# Patient Record
Sex: FEMALE | Race: BLACK OR AFRICAN AMERICAN | NOT HISPANIC OR LATINO | Employment: OTHER | URBAN - METROPOLITAN AREA
[De-identification: names, ages, dates, MRNs, and addresses within clinical notes are randomized per-mention and may not be internally consistent; named-entity substitution may affect disease eponyms.]

---

## 2017-05-08 ENCOUNTER — HOSPITAL ENCOUNTER (EMERGENCY)
Facility: HOSPITAL | Age: 52
Discharge: HOME | End: 2017-05-08
Attending: SPECIALIST
Payer: MEDICAID

## 2017-05-08 VITALS
SYSTOLIC BLOOD PRESSURE: 137 MMHG | TEMPERATURE: 98 F | OXYGEN SATURATION: 100 % | HEART RATE: 79 BPM | RESPIRATION RATE: 20 BRPM | DIASTOLIC BLOOD PRESSURE: 89 MMHG

## 2017-05-08 DIAGNOSIS — R56.9 SEIZURE: Primary | ICD-10-CM

## 2017-05-08 DIAGNOSIS — E87.6 HYPOKALEMIA: ICD-10-CM

## 2017-05-08 LAB
ALBUMIN SERPL BCP-MCNC: 3.7 G/DL
ALP SERPL-CCNC: 105 U/L
ALT SERPL W/O P-5'-P-CCNC: 9 U/L
AMPHET+METHAMPHET UR QL: NEGATIVE
ANION GAP SERPL CALC-SCNC: 14 MMOL/L
ANISOCYTOSIS BLD QL SMEAR: SLIGHT
AST SERPL-CCNC: 18 U/L
BACTERIA #/AREA URNS HPF: ABNORMAL /HPF
BARBITURATES UR QL SCN>200 NG/ML: NEGATIVE
BASOPHILS # BLD AUTO: 0.03 K/UL
BASOPHILS NFR BLD: 0.6 %
BENZODIAZ UR QL SCN>200 NG/ML: NEGATIVE
BILIRUB SERPL-MCNC: 0.6 MG/DL
BILIRUB UR QL STRIP: NEGATIVE
BUN SERPL-MCNC: 14 MG/DL
BZE UR QL SCN: NEGATIVE
CALCIUM SERPL-MCNC: 9.5 MG/DL
CANNABINOIDS UR QL SCN: NEGATIVE
CHLORIDE SERPL-SCNC: 105 MMOL/L
CLARITY UR: CLEAR
CO2 SERPL-SCNC: 23 MMOL/L
COLOR UR: YELLOW
CREAT SERPL-MCNC: 0.8 MG/DL
CREAT UR-MCNC: 62.5 MG/DL
DACRYOCYTES BLD QL SMEAR: ABNORMAL
DIFFERENTIAL METHOD: ABNORMAL
EOSINOPHIL # BLD AUTO: 0.1 K/UL
EOSINOPHIL NFR BLD: 1.9 %
ERYTHROCYTE [DISTWIDTH] IN BLOOD BY AUTOMATED COUNT: 13.3 %
EST. GFR  (AFRICAN AMERICAN): >60 ML/MIN/1.73 M^2
EST. GFR  (NON AFRICAN AMERICAN): >60 ML/MIN/1.73 M^2
GLUCOSE SERPL-MCNC: 134 MG/DL
GLUCOSE UR QL STRIP: NEGATIVE
HCT VFR BLD AUTO: 35.8 %
HGB BLD-MCNC: 12.1 G/DL
HGB UR QL STRIP: ABNORMAL
HYALINE CASTS #/AREA URNS LPF: 0 /LPF
KETONES UR QL STRIP: NEGATIVE
LEUKOCYTE ESTERASE UR QL STRIP: NEGATIVE
LYMPHOCYTES # BLD AUTO: 2.2 K/UL
LYMPHOCYTES NFR BLD: 40.6 %
MAGNESIUM SERPL-MCNC: 2.3 MG/DL
MCH RBC QN AUTO: 31.4 PG
MCHC RBC AUTO-ENTMCNC: 33.8 %
MCV RBC AUTO: 93 FL
METHADONE UR QL SCN>300 NG/ML: NEGATIVE
MICROSCOPIC COMMENT: ABNORMAL
MONOCYTES # BLD AUTO: 0.6 K/UL
MONOCYTES NFR BLD: 10.7 %
NEUTROPHILS # BLD AUTO: 2.5 K/UL
NEUTROPHILS NFR BLD: 46.2 %
NITRITE UR QL STRIP: NEGATIVE
OPIATES UR QL SCN: NEGATIVE
PCP UR QL SCN>25 NG/ML: NEGATIVE
PH UR STRIP: 7 [PH] (ref 5–8)
PLATELET # BLD AUTO: 244 K/UL
PMV BLD AUTO: 10.7 FL
POCT GLUCOSE: 100 MG/DL (ref 70–110)
POIKILOCYTOSIS BLD QL SMEAR: SLIGHT
POTASSIUM SERPL-SCNC: 3 MMOL/L
PROT SERPL-MCNC: 7.8 G/DL
PROT UR QL STRIP: ABNORMAL
RBC # BLD AUTO: 3.85 M/UL
RBC #/AREA URNS HPF: 5 /HPF (ref 0–4)
SODIUM SERPL-SCNC: 142 MMOL/L
SP GR UR STRIP: 1.02 (ref 1–1.03)
TOXICOLOGY INFORMATION: NORMAL
TROPONIN I SERPL DL<=0.01 NG/ML-MCNC: 0.01 NG/ML
URN SPEC COLLECT METH UR: ABNORMAL
UROBILINOGEN UR STRIP-ACNC: 1 EU/DL
WBC # BLD AUTO: 5.32 K/UL
WBC #/AREA URNS HPF: 4 /HPF (ref 0–5)

## 2017-05-08 PROCEDURE — 85025 COMPLETE CBC W/AUTO DIFF WBC: CPT

## 2017-05-08 PROCEDURE — 25000003 PHARM REV CODE 250: Performed by: SPECIALIST

## 2017-05-08 PROCEDURE — 82962 GLUCOSE BLOOD TEST: CPT

## 2017-05-08 PROCEDURE — 84484 ASSAY OF TROPONIN QUANT: CPT

## 2017-05-08 PROCEDURE — 36415 COLL VENOUS BLD VENIPUNCTURE: CPT

## 2017-05-08 PROCEDURE — 83735 ASSAY OF MAGNESIUM: CPT

## 2017-05-08 PROCEDURE — 93010 ELECTROCARDIOGRAM REPORT: CPT | Mod: ,,, | Performed by: INTERNAL MEDICINE

## 2017-05-08 PROCEDURE — 93005 ELECTROCARDIOGRAM TRACING: CPT

## 2017-05-08 PROCEDURE — 80177 DRUG SCRN QUAN LEVETIRACETAM: CPT

## 2017-05-08 PROCEDURE — 80053 COMPREHEN METABOLIC PANEL: CPT

## 2017-05-08 PROCEDURE — 82570 ASSAY OF URINE CREATININE: CPT

## 2017-05-08 PROCEDURE — 99285 EMERGENCY DEPT VISIT HI MDM: CPT | Mod: 25

## 2017-05-08 PROCEDURE — 81000 URINALYSIS NONAUTO W/SCOPE: CPT

## 2017-05-08 RX ORDER — POTASSIUM CHLORIDE 20 MEQ/1
20 TABLET, EXTENDED RELEASE ORAL DAILY
Qty: 20 TABLET | Refills: 0 | Status: SHIPPED | OUTPATIENT
Start: 2017-05-08 | End: 2020-01-13

## 2017-05-08 RX ORDER — CLONIDINE HYDROCHLORIDE 0.1 MG/1
0.1 TABLET ORAL
Status: COMPLETED | OUTPATIENT
Start: 2017-05-08 | End: 2017-05-08

## 2017-05-08 RX ORDER — POTASSIUM CHLORIDE 20 MEQ/1
60 TABLET, EXTENDED RELEASE ORAL
Status: COMPLETED | OUTPATIENT
Start: 2017-05-08 | End: 2017-05-08

## 2017-05-08 RX ADMIN — CLONIDINE HYDROCHLORIDE 0.1 MG: 0.1 TABLET ORAL at 07:05

## 2017-05-08 RX ADMIN — POTASSIUM CHLORIDE 60 MEQ: 1500 TABLET, EXTENDED RELEASE ORAL at 08:05

## 2017-05-08 NOTE — ED NOTES
Called family member at [phone] notified that pt is discharged. Male family member will come pick her up.

## 2017-05-08 NOTE — ED NOTES
Patient had episode of urinary incontinence.  Patient's soiled clothing removed and placed in patient belongings bag.  Patient placed in clean gown, brief, and green pad.  Patient given two warm blankets.

## 2017-05-08 NOTE — ED NOTES
Patient resting in ER stretcher in no acute distress.  No seizure activity noted at this time.  Vitals improved.  Patient is a poor historian and unable to answer questions about medical history and medications.  Bed remains in low, locked, position with call light within reach and side rails up x 2.  Patient's room located near nurses station for direct observation.  Will continue to monitor.

## 2017-05-08 NOTE — ED AVS SNAPSHOT
[facility] -   [id number] [place] [id number]               Sera Whelan   2017  5:10 AM   ED    Description:  Female : 1965   Department:  [facility] -            Your Care was Coordinated By:     Provider Role From To    Vanessa Lundy MD Attending Provider 17 0602 --      Reason for Visit     Seizures           Diagnoses this Visit        Comments    Seizure    -  Primary     Hypokalemia           ED Disposition     None           To Do List           Follow-up Information     Please follow up.    Why:  PCP have potassium rechecked in 1 week        Follow up with [facility]    Specialty:  Emergency Medicine    Why:  If symptoms worsen    Contact information:    [address] [place] [id number] [phone]       These Medications        Disp Refills Start End    potassium chloride SA (K-DUR,KLOR-CON) 20 MEQ tablet 20 tablet 0 2017     Take 1 tablet (20 mEq total) by mouth once daily. - Oral    Pharmacy: [facility] [address] [place] [address] -  [address] AT [facility] Ph #: [phone]         [facility]     [facility] Nurse Care Line -  Assistance  Unless otherwise directed by your provider, please contact [facility], our nurse care line that is available for  assistance.     Registered nurses in the [facility] provide: appointment scheduling, clinical advisement, health education, and other advisory services.  Call: [phone] (toll free)               Medications           Message regarding Medications     Verify the changes and/or additions to your medication regime listed below are the same as discussed with your clinician today.  If any of these changes or additions are incorrect, please notify your healthcare provider.        START taking these NEW medications        Refills    potassium  chloride SA (K-DUR,KLOR-CON) 20 MEQ tablet 0    Sig: Take 1 tablet (20 mEq total) by mouth once daily.    Class: Print    Route: Oral      These medications were administered today        Dose Freq    cloNIDine tablet 0.1 mg 0.1 mg ED 1 Time    Sig: Take 1 tablet (0.1 mg total) by mouth ED 1 Time.    Class: Normal    Route: Oral    potassium chloride SA CR tablet 60 mEq 60 mEq ED 1 Time    Sig: Take 3 tablets (60 mEq total) by mouth ED 1 Time.    Class: Normal    Route: Oral           Verify that the below list of medications is an accurate representation of the medications you are currently taking.  If none reported, the list may be blank. If incorrect, please contact your healthcare provider. Carry this list with you in case of emergency.           Current Medications     amlodipine (NORVASC) 5 MG tablet Take 1 tablet (5 mg total) by mouth once daily.    aspirin 81 MG Chew Take 1 tablet (81 mg total) by mouth once daily.    benztropine (COGENTIN) 1 MG tablet Take 1 mg by mouth 2 (two) times daily.    ferrous sulfate 325 (65 FE) MG EC tablet Take 1 tablet (325 mg total) by mouth 2 (two) times daily.    hydrALAZINE (APRESOLINE) 50 MG tablet Take 1 tablet (50 mg total) by mouth every 8 (eight) hours.    levetiracetam (KEPPRA) 1000 MG tablet Take 1 tablet (1,000 mg total) by mouth 2 (two) times daily.    perphenazine (TRILAFON) 4 MG tablet Take 4 mg by mouth 2 (two) times daily.    potassium chloride SA (K-DUR,KLOR-CON) 20 MEQ tablet Take 1 tablet (20 mEq total) by mouth once daily.    risperidone (RISPERDAL) 3 MG Tab Take 3 mg by mouth 2 (two) times daily.     sertraline (ZOLOFT) 50 MG tablet Take 50 mg by mouth once daily.           Clinical Reference Information           Your Vitals Were     BP Pulse Temp Resp Last Period SpO2    165/91 77 98.8 °F (37.1 °C) (Oral) 26 08/04/2015 100%      Allergies as of 5/8/2017     No Known Allergies      Immunizations Administered on Date of Encounter - 5/8/2017     None      ED  Micro, Lab, POCT     Start Ordered       Status Ordering Provider    05/08/17 0830 05/08/17 0830  Magnesium  Add-on      Completed     05/08/17 0701 05/08/17 0700  Levetiracetam level  Add-on      Completed     05/08/17 0700 05/08/17 0659  Troponin I  Add-on      Completed     05/08/17 0631 05/08/17 0631  POCT glucose  Once      Final result     05/08/17 0615 05/08/17 0614  Urinalysis  Once      Final result     05/08/17 0615 05/08/17 0614  Drug screen panel, emergency  STAT      Final result     05/08/17 0614 05/08/17 0614  Urinalysis Microscopic  Once      Final result     05/08/17 0610 05/08/17 0609  CBC auto differential  Once      Final result     05/08/17 0610 05/08/17 0609  Comprehensive metabolic panel  Once      Final result     05/08/17 0610 05/08/17 0609  POCT glucose  Once      Acknowledged     05/08/17 0609 05/08/17 0609  Levetiracetam level  Once      In process     05/08/17 0609 05/08/17 0609  Troponin I  Once      Final result     05/08/17 0609 05/08/17 0609  Magnesium  Once      Final result       ED Imaging Orders     Start Ordered       Status Ordering Provider    05/08/17 0700 05/08/17 0659  X-Ray Chest 1 View  1 time imaging      Final result     05/08/17 0610 05/08/17 0609  CT Head Without Contrast  1 time imaging      Final result       Discharge References/Attachments     SEIZURE, RECURRENT (ADULT) (ENGLISH)    HYPOKALEMIA, DISCHARGE INSTRUCTIONS (ENGLISH)      [facility] Sign-Up     Activating your [facility] account is as easy as 1-2-3!     1) Visit [email or web address], select Sign Up Now, enter this activation code and your date of birth, then select Next.  [email or web address]  Expires: 6/22/2017  9:25 AM      2) Create a username and password to use when you visit [facility] in the future and select a security question in case you lose your password and select Next.    3) Enter your e-mail address and click Sign Up!    Additional Information  If you have questions, please e-mail  [email or web address] or call [phone] to talk to our [facility] staff. Remember, [facility] is NOT to be used for urgent needs. For medical emergencies, dial 911.          [facility] complies with applicable Federal civil rights laws and does not discriminate on the basis of race, color, national origin, age, disability, or sex.        Language Assistance Services     ATTENTION: Language assistance services are available, free of charge. Please call [phone].      ATENCIÓN: Si habla español, tiene a giron disposición servicios gratuitos de asistencia lingüística. Llame al [phone].     CHÚ Ý: N?u b?n nói Ti?ng Vi?t, có các d?ch v? h? tr? ngôn ng? mi?n phí dành cho b?n. G?i s? [phone].

## 2017-05-08 NOTE — ED NOTES
Pt found lying quietly in bed, awake, alert. Asked pt where she was and she stated [facility], denied medical history, medications, mumbling answers, unable to understand all responses. Skin warm and dry, appears very thin, respirations even non-labored, BBS-CTA, abd soft non-tender, normal BS, pulses, motion and sensation intact all ext. IV noted to right hand. Side rails up x 2, bed in low position, call bell in reach. Rails padded for seizure precaution.

## 2017-05-08 NOTE — ED NOTES
Received report from SHWETA Campos RN. In bed resting,aaox3,skin warm dry to touch,equal bilateral clear lung sounds,side rails up x 2,bed locked and in low position,instructed to call with any needs.

## 2017-05-08 NOTE — ED NOTES
Pt ambulated to the bathroom without assistance. Placed back on CM, padded side rails up x 2, bed in low position, call bell in reach. No change in pt condition.

## 2017-05-08 NOTE — ED PROVIDER NOTES
SCRIBE #1 NOTE: I, Will Akers, am scribing for, and in the presence of, Vanessa Lundy MD. I have scribed the entire note.      History      Chief Complaint   Patient presents with    Seizures     Post-ictal upon arrival.       Review of patient's allergies indicates:  No Known Allergies     HPI   HPI    5/8/2017, 6:08 AM   History obtained from the patient/nurse/EMS      History of Present Illness: Sera Whelan is a 51 y.o. female patient who presents to the Emergency Department for seizures which onset suddenly this morning. Symptoms are episodic and moderate in severity. No mitigating or exacerbating factors reported. No prior Tx reported per EMS. HPI, ROS, and Hx is limited due to pt being postictal and no family at bedside. EMS reported no further complaints or concerns at this time.     Arrival mode: Personal vehicle    PCP: Primary Doctor No       Past Medical History:  Past Medical History:   Diagnosis Date    Bipolar 1 disorder     Diabetes mellitus     Encounter for blood transfusion     Hypertension     Seizures     Stroke        Past Surgical History:  History reviewed. No pertinent surgical history.      Family History:  Family History   Problem Relation Age of Onset    Diabetes Mother     Stroke Mother        Social History:  Social History     Social History Main Topics    Smoking status: Never Smoker    Smokeless tobacco: Never Used    Alcohol use No    Drug use: No    Sexual activity: Unknown       ROS   Review of Systems   Unable to perform ROS: Other   Unable to perform ROS secondary to pt being postictal    Physical Exam    Initial Vitals   BP Pulse Resp Temp SpO2   05/08/17 0521 05/08/17 0521 05/08/17 0521 05/08/17 0521 05/08/17 0521   176/92 108 15 98.3 °F (36.8 °C) 98 %      Physical Exam  Nursing Notes and Vital Signs Reviewed.  Constitutional: Patient is in no acute distress. Awake and alert. Well-developed and well-nourished.  Head: Atraumatic.  Normocephalic.  Eyes: PERRL. EOM intact. Conjunctivae are not pale. No scleral icterus.  ENT: Mucous membranes are moist. Oropharynx is clear and symmetric.    Neck: Supple. Full ROM. No lymphadenopathy.  Cardiovascular: Tackycardic. Regular rhythm. No murmurs, rubs, or gallops. Distal pulses are 2+ and symmetric.  Pulmonary/Chest: No respiratory distress. Clear to auscultation bilaterally. No wheezing, rales, or rhonchi.  Abdominal: Soft and non-distended. No rebound, guarding, or rigidity. Good bowel sounds.  Musculoskeletal: Moves all extremities. No obvious deformities. No edema.  Skin: Warm and dry.  Neurological: Patient is awake, alert and oriented to person but not place and time. Pupils ERRL and EOM normal. Light touch sense is intact. Limited neurological exam secondary to postictal state.  Psychiatric: Limited secondary to postictal state.     ED Course    Procedures  ED Vital Signs:  Vitals:    05/08/17 0521 05/08/17 0536 05/08/17 0625 05/08/17 0629   BP: (!) 176/92 (!) 161/106 (!) 173/115 (!) 190/107   Pulse: 108 97 87 89   Resp: 15 20 19 (!) 23   Temp: 98.3 °F (36.8 °C)      TempSrc: Oral      SpO2: 98% 99% 100% 98%    05/08/17 0700 05/08/17 0710 05/08/17 0808 05/08/17 0827   BP: (!) 174/111 (!) 186/115 (!) 197/100 (!) 158/95   Pulse: 81 82 77 76   Resp: 18 (!) 26 (!) 23 (!) 27   Temp:  98.8 °F (37.1 °C)     TempSrc:  Oral     SpO2: 99% 99% 100% 100%    05/08/17 0847 05/08/17 0952 05/08/17 1014 05/08/17 1052   BP: (!) 165/91 (!) 142/82 (!) 141/89 137/89   Pulse: 77 73 81 79   Resp: (!) 26 19 20 20   Temp:    98 °F (36.7 °C)   TempSrc:       SpO2: 100% 98% 100% 100%       Abnormal Lab Results:  Labs Reviewed   CBC W/ AUTO DIFFERENTIAL - Abnormal; Notable for the following:        Result Value    RBC 3.85 (*)     Hematocrit 35.8 (*)     MCH 31.4 (*)     All other components within normal limits   COMPREHENSIVE METABOLIC PANEL - Abnormal; Notable for the following:     Potassium 3.0 (*)     Glucose 134  (*)     ALT 9 (*)     All other components within normal limits   URINALYSIS - Abnormal; Notable for the following:     Protein, UA 2+ (*)     Occult Blood UA 1+ (*)     All other components within normal limits   URINALYSIS MICROSCOPIC - Abnormal; Notable for the following:     RBC, UA 5 (*)     All other components within normal limits   DRUG SCREEN PANEL, URINE EMERGENCY   TROPONIN I   LEVETIRACETAM  (KEPPRA) LEVEL   TROPONIN I   MAGNESIUM   MAGNESIUM   LEVETIRACETAM  (KEPPRA) LEVEL   POCT GLUCOSE   POCT GLUCOSE MONITORING CONTINUOUS        All Lab Results:  Results for orders placed or performed during the hospital encounter of 05/08/17   CBC auto differential   Result Value Ref Range    WBC 5.32 3.90 - 12.70 K/uL    RBC 3.85 (L) 4.00 - 5.40 M/uL    Hemoglobin 12.1 12.0 - 16.0 g/dL    Hematocrit 35.8 (L) 37.0 - 48.5 %    MCV 93 82 - 98 fL    MCH 31.4 (H) 27.0 - 31.0 pg    MCHC 33.8 32.0 - 36.0 %    RDW 13.3 11.5 - 14.5 %    Platelets 244 150 - 350 K/uL    MPV 10.7 9.2 - 12.9 fL    Gran # 2.5 1.8 - 7.7 K/uL    Lymph # 2.2 1.0 - 4.8 K/uL    Mono # 0.6 0.3 - 1.0 K/uL    Eos # 0.1 0.0 - 0.5 K/uL    Baso # 0.03 0.00 - 0.20 K/uL    Gran% 46.2 38.0 - 73.0 %    Lymph% 40.6 18.0 - 48.0 %    Mono% 10.7 4.0 - 15.0 %    Eosinophil% 1.9 0.0 - 8.0 %    Basophil% 0.6 0.0 - 1.9 %    Aniso Slight     Poik Slight     Tear Drop Cells Occasional     Differential Method Automated    Comprehensive metabolic panel   Result Value Ref Range    Sodium 142 136 - 145 mmol/L    Potassium 3.0 (L) 3.5 - 5.1 mmol/L    Chloride 105 95 - 110 mmol/L    CO2 23 23 - 29 mmol/L    Glucose 134 (H) 70 - 110 mg/dL    BUN, Bld 14 6 - 20 mg/dL    Creatinine 0.8 0.5 - 1.4 mg/dL    Calcium 9.5 8.7 - 10.5 mg/dL    Total Protein 7.8 6.0 - 8.4 g/dL    Albumin 3.7 3.5 - 5.2 g/dL    Total Bilirubin 0.6 0.1 - 1.0 mg/dL    Alkaline Phosphatase 105 55 - 135 U/L    AST 18 10 - 40 U/L    ALT 9 (L) 10 - 44 U/L    Anion Gap 14 8 - 16 mmol/L    eGFR if African American  >60 >60 mL/min/1.73 m^2    eGFR if non African American >60 >60 mL/min/1.73 m^2   Urinalysis   Result Value Ref Range    Specimen UA Urine, Clean Catch     Color, UA Yellow Yellow, Straw, Arianna    Appearance, UA Clear Clear    pH, UA 7.0 5.0 - 8.0    Specific Gravity, UA 1.025 1.005 - 1.030    Protein, UA 2+ (A) Negative    Glucose, UA Negative Negative    Ketones, UA Negative Negative    Bilirubin (UA) Negative Negative    Occult Blood UA 1+ (A) Negative    Nitrite, UA Negative Negative    Urobilinogen, UA 1.0 <2.0 EU/dL    Leukocytes, UA Negative Negative   Drug screen panel, emergency   Result Value Ref Range    Benzodiazepines Negative     Methadone metabolites Negative     Cocaine (Metab.) Negative     Opiate Scrn, Ur Negative     Barbiturate Screen, Ur Negative     Amphetamine Screen, Ur Negative     THC Negative     Phencyclidine Negative     Creatinine, Random Ur 62.5 15.0 - 325.0 mg/dL    Toxicology Information SEE COMMENT    Urinalysis Microscopic   Result Value Ref Range    RBC, UA 5 (H) 0 - 4 /hpf    WBC, UA 4 0 - 5 /hpf    Bacteria, UA Occasional None-Occ /hpf    Hyaline Casts, UA 0 0-1/lpf /lpf    Microscopic Comment SEE COMMENT    Troponin I   Result Value Ref Range    Troponin I 0.015 0.000 - 0.026 ng/mL   Magnesium   Result Value Ref Range    Magnesium 2.3 1.6 - 2.6 mg/dL   POCT glucose   Result Value Ref Range    POCT Glucose 100 70 - 110 mg/dL         Imaging Results:  Imaging Results         X-Ray Chest 1 View (Final result) Result time:  05/08/17 07:38:31    Final result by Armaan Purcell MD (05/08/17 07:38:31)    Impression:     Negative      Electronically signed by: ARMAAN PURCELL MD  Date:     05/08/17  Time:    07:38     Narrative:    History: Seizure, shortness of breath    Normal heart size. Clear lungs.            CT Head Without Contrast (Final result) Result time:  05/08/17 08:06:23    Final result by Eliot Rios MD (05/08/17 08:06:23)    Impression:           1.  Significant  motion artifact is present on all images provided.  Subtle abnormalities could easily be overlooked.  2.  Negative obvious acute intracranial process.  3. Cerebral atrophy, intracranial calcifications and small vessel ischemic changes noted.  Stable areas of encephalomalacia involving the anterior lateral right frontal lobe and left parietal occipital lobe posteriorly.  4.  Other stable findings as noted above.    All CT scans at this facility used dose modulation, iterative reconstruction, and/or weight based dosing when appropriate to reduce radiation dose to as low as reasonably achievable.      Electronically signed by: MARY HILLS MD  Date:     05/08/17  Time:    08:06     Narrative:    Head CT scan without contrast    Clinical Indication: seizure.    Findings:  Comparisons are made to 07/03/2016.  Multiple series were obtained, with motion artifact on all 3 series.  Subtle abnormalities could easily be overlooked.   The ventricles are midline and the CSF spaces are prominent. The gray-white matter junction is grossly well preserved. Negative for intracranial vascular abnormalities. Negative for mass, mass effect, cerebral edema, hemorrhage or abnormal fluid collections.  Intracranial calcifications and small vessel ischemic changes noted.  Stable encephalomalacia involving the anterior right lateral parietal lobe, and left parietal occipital lobe region.    The skull and scalp are intact.    The   paranasal sinuses, mastoid air cells, middle ears and ear canals are clear. The globes are intact.              The EKG was ordered, reviewed, and independently interpreted by the ED provider.  Interpretation time: 0712  Rate: 82 BPM  Rhythm: normal sinus rhythm  Interpretation: Septal infarct. No STEMI.           The Emergency Provider reviewed the vital signs and test results, which are outlined above.    .    ED Discussion     8:26 AM Dr. Lundy spoke to pt's brother and received pts baseline information. Pt's  brother confirmed that pt's current state is her baseline (awake, alert and oriented to person but not place or time). Per brother pt's seizure was witnessed by son and lasted approximately 1 minute.     9:24 AM: Reassessed pt at this time.  Pt states her condition has improved at this time. Discussed with pt all pertinent ED information and results. Discussed pt dx and plan of tx. Gave pt all f/u and return to the ED instructions. All questions and concerns were addressed at this time. Pt expresses understanding of information and instructions, and is comfortable with plan to discharge. Pt is stable for discharge.    Patient presents with seizure or seizure-like symptoms. I have no suspicion of an intracranial, traumatic, infectious, metabolic, toxic, cardiovascular (specifically arrhythmia), or other emergent medical condition requiring further intervention. Seizure precautions were discussed with the patient and/or caretaker, specifically not to swim unattended, not to operate motor vehicles or other machinery, and to avoid heights or other areas where falls may occur until cleared by primary care physician. Patient is safe for discharge.        ED Medication(s):  Medications   cloNIDine tablet 0.1 mg (0.1 mg Oral Given 5/8/17 0731)   potassium chloride SA CR tablet 60 mEq (60 mEq Oral Given 5/8/17 0846)       Discharge Medication List as of 5/8/2017  9:25 AM      START taking these medications    Details   potassium chloride SA (K-DUR,KLOR-CON) 20 MEQ tablet Take 1 tablet (20 mEq total) by mouth once daily., Starting 5/8/2017, Until Discontinued, Print             Follow-up Information     Please follow up.    Why:  PCP have potassium rechecked in 1 week        Follow up with [facility].    Specialty:  Emergency Medicine    Why:  If symptoms worsen    Contact information:    [address]  [facility] [address] [phone]            Medical Decision Making    Medical  Decision Making:   Clinical Tests:   Lab Tests: Ordered and Reviewed  Radiological Study: Ordered and Reviewed  Medical Tests: Ordered and Reviewed  ED Management:  MDM: Seizure precautions - Patient presents with seizure or seizure-like symptoms. I have no suspicion of an intracranial, traumatic, infectious, metabolic, toxic, cardiovascular (specifically arrhythmia), or other emergent medical condition requiring further intervention. Seizure precautions were discussed with the patient and/or caretaker, specifically not to swim unattended, not to operate motor vehicles or other machinery, and to avoid heights or other areas where falls may occur until cleared by primary care physician. Patient is safe for discharge.              Scribe Attestation:   Scribe #1: I performed the above scribed service and the documentation accurately describes the services I performed. I attest to the accuracy of the note.    Attending:   Physician Attestation Statement for Scribe #1: I, Vanessa Lundy MD, personally performed the services described in this documentation, as scribed by Will Akers, in my presence, and it is both accurate and complete.          Clinical Impression       ICD-10-CM ICD-9-CM   1. Seizure R56.9 780.39   2. Hypokalemia E87.6 276.8       Disposition:   Disposition: Discharged  Condition: Stable         Vanessa Lundy MD  05/08/17 1100

## 2017-05-10 LAB — LEVETIRACETAM SERPL-MCNC: 7.7 UG/ML (ref 3–60)

## 2017-05-31 ENCOUNTER — LAB VISIT (OUTPATIENT)
Dept: LAB | Facility: HOSPITAL | Age: 52
End: 2017-05-31
Attending: INTERNAL MEDICINE
Payer: MEDICAID

## 2017-05-31 ENCOUNTER — OFFICE VISIT (OUTPATIENT)
Dept: INTERNAL MEDICINE | Facility: CLINIC | Age: 52
End: 2017-05-31
Payer: MEDICAID

## 2017-05-31 VITALS
OXYGEN SATURATION: 99 % | DIASTOLIC BLOOD PRESSURE: 110 MMHG | SYSTOLIC BLOOD PRESSURE: 182 MMHG | HEIGHT: 65 IN | BODY MASS INDEX: 18.18 KG/M2 | HEART RATE: 77 BPM | TEMPERATURE: 98 F | WEIGHT: 109.13 LBS

## 2017-05-31 DIAGNOSIS — R68.89 COLD INTOLERANCE: ICD-10-CM

## 2017-05-31 DIAGNOSIS — I10 HYPERTENSION GOAL BP (BLOOD PRESSURE) < 140/90: Primary | ICD-10-CM

## 2017-05-31 DIAGNOSIS — G40.909 SEIZURE DISORDER: ICD-10-CM

## 2017-05-31 DIAGNOSIS — Z11.59 NEED FOR HEPATITIS C SCREENING TEST: ICD-10-CM

## 2017-05-31 DIAGNOSIS — E87.6 HYPOKALEMIA: ICD-10-CM

## 2017-05-31 DIAGNOSIS — F31.9 BIPOLAR DISORDER, UNSPECIFIED: ICD-10-CM

## 2017-05-31 DIAGNOSIS — R73.01 IMPAIRED FASTING GLUCOSE: ICD-10-CM

## 2017-05-31 LAB
ANION GAP SERPL CALC-SCNC: 9 MMOL/L
BUN SERPL-MCNC: 19 MG/DL
CALCIUM SERPL-MCNC: 10 MG/DL
CHLORIDE SERPL-SCNC: 107 MMOL/L
CO2 SERPL-SCNC: 29 MMOL/L
CREAT SERPL-MCNC: 0.9 MG/DL
EST. GFR  (AFRICAN AMERICAN): >60 ML/MIN/1.73 M^2
EST. GFR  (NON AFRICAN AMERICAN): >60 ML/MIN/1.73 M^2
FERRITIN SERPL-MCNC: 31 NG/ML
GLUCOSE SERPL-MCNC: 91 MG/DL
IRON SERPL-MCNC: 70 UG/DL
POTASSIUM SERPL-SCNC: 3.8 MMOL/L
SATURATED IRON: 21 %
SODIUM SERPL-SCNC: 145 MMOL/L
TOTAL IRON BINDING CAPACITY: 326 UG/DL
TRANSFERRIN SERPL-MCNC: 220 MG/DL
TSH SERPL DL<=0.005 MIU/L-ACNC: 1.04 UIU/ML

## 2017-05-31 PROCEDURE — 36415 COLL VENOUS BLD VENIPUNCTURE: CPT

## 2017-05-31 PROCEDURE — 82728 ASSAY OF FERRITIN: CPT

## 2017-05-31 PROCEDURE — 99999 PR PBB SHADOW E&M-EST. PATIENT-LVL III: CPT | Mod: PBBFAC,,, | Performed by: INTERNAL MEDICINE

## 2017-05-31 PROCEDURE — 83540 ASSAY OF IRON: CPT

## 2017-05-31 PROCEDURE — 80048 BASIC METABOLIC PNL TOTAL CA: CPT

## 2017-05-31 PROCEDURE — 99214 OFFICE O/P EST MOD 30 MIN: CPT | Mod: S$PBB,,, | Performed by: INTERNAL MEDICINE

## 2017-05-31 PROCEDURE — 83036 HEMOGLOBIN GLYCOSYLATED A1C: CPT

## 2017-05-31 PROCEDURE — 84443 ASSAY THYROID STIM HORMONE: CPT

## 2017-05-31 PROCEDURE — 86803 HEPATITIS C AB TEST: CPT

## 2017-05-31 RX ORDER — LEVETIRACETAM 1000 MG/1
1000 TABLET ORAL 2 TIMES DAILY
Qty: 60 TABLET | Refills: 3 | Status: SHIPPED | OUTPATIENT
Start: 2017-05-31 | End: 2017-09-14 | Stop reason: SDUPTHER

## 2017-06-01 LAB
ESTIMATED AVG GLUCOSE: 111 MG/DL
HBA1C MFR BLD HPLC: 5.5 %
HCV AB SERPL QL IA: NEGATIVE

## 2017-06-02 ENCOUNTER — TELEPHONE (OUTPATIENT)
Dept: INTERNAL MEDICINE | Facility: CLINIC | Age: 52
End: 2017-06-02

## 2017-06-02 RX ORDER — AMLODIPINE BESYLATE 5 MG/1
5 TABLET ORAL DAILY
Qty: 30 TABLET | Refills: 0 | Status: SHIPPED | OUTPATIENT
Start: 2017-06-02 | End: 2017-09-11 | Stop reason: SDUPTHER

## 2017-06-02 NOTE — PROGRESS NOTES
Subjective:       Patient ID: Sera Whelan is a 51 y.o. female.    Chief Complaint: Annual Exam    Sera Whelan  51 y.o. Black or  female     Patient presents with:  Annual Exam    HPI: Here today for an annual physical. She is here with her daughter.  HTN--significantly elevated. She is not on any medications currently. She has been prescribed medication in the past. She denies symptoms.   [facility]--[facility]. She denies seizures. She was last in the ER a month ago for seizures. She has multiple ER visits for seizures due to running out of medication.   Bipolar disorder--she has not seen psychiatry lately and is not taking any medication. Her daughter plans to make an appointment.   She thinks she is anemia because she is cold all the time. Her last CBC showed a hemoglobin at the low side of normal.                HGB                      12.1                05/08/2017                 HCT                      35.8 (L)            05/08/2017                 MCV                      93                  05/08/2017            She has a history of elevated glucose and a reported history of diabetes. Her A1C has not been in diabetic range.   Her last labs in the ER showed a potassium of 3.0.         Eye Exam due on 11/24/1973  TETANUS VACCINE due on 11/24/1981  Pap Smear with HPV Cotest due on 11/24/1984  Mammogram due on 11/24/2003  Lipid Panel due on 08/05/2014  Colonoscopy due on 02/08/2016  Foot Exam due on 08/26/2016  Influenza Vaccine due on 08/01/2017  Hemoglobin A1c due on 11/30/2017  Hepatitis C Screening Completed    Past Medical History:  Bipolar 1 disorder  Diabetes mellitus  Encounter for blood transfusion  Hypertension  Seizures  Stroke    History reviewed. No pertinent surgical history.    Review of patient's family history indicates:    Diabetes                       Mother                    Stroke                         Mother                    Current  Outpatient Prescriptions on File Prior to Visit:  aspirin 81 MG Chew, Take 1 tablet (81 mg total) by mouth once daily., Disp: 30 tablet, Rfl: 0  benztropine (COGENTIN) 1 MG tablet, Take 1 mg by mouth 2 (two) times daily., Disp: , Rfl:   ferrous sulfate 325 (65 FE) MG EC tablet, Take 1 tablet (325 mg total) by mouth 2 (two) times daily., Disp: 60 tablet, Rfl: 2  perphenazine (TRILAFON) 4 MG tablet, Take 4 mg by mouth 2 (two) times daily., Disp: , Rfl:   potassium chloride SA (K-DUR,KLOR-CON) 20 MEQ tablet, Take 1 tablet (20 mEq total) by mouth once daily., Disp: 20 tablet, Rfl: 0  risperidone (RISPERDAL) 3 MG Tab, Take 3 mg by mouth 2 (two) times daily. , Disp: , Rfl:   sertraline (ZOLOFT) 50 MG tablet, Take 50 mg by mouth once daily., Disp: , Rfl:     No current facility-administered medications on file prior to visit.       Allergies:  Review of patient's allergies indicates:  No Known Allergies                Review of Systems   Constitutional: Negative for appetite change, fever and unexpected weight change.   Respiratory: Negative for shortness of breath.    Cardiovascular: Negative for chest pain and leg swelling.   Gastrointestinal: Negative for abdominal pain, constipation and diarrhea.   Endocrine: Positive for cold intolerance.   Genitourinary: Negative for dysuria.   Musculoskeletal: Negative for gait problem.   Neurological: Negative for dizziness and headaches.   Psychiatric/Behavioral: Negative for behavioral problems.       Objective:      Physical Exam   Constitutional: She is oriented to person, place, and time. She appears well-developed and well-nourished. No distress.   Eyes: No scleral icterus.   Neck: No tracheal deviation present. No thyromegaly present.   Cardiovascular: Normal rate, regular rhythm and normal heart sounds.    Pulmonary/Chest: Effort normal and breath sounds normal. No respiratory distress.   Abdominal: Soft. Bowel sounds are normal.   Musculoskeletal: She exhibits no edema.    Lymphadenopathy:     She has no cervical adenopathy.   Neurological: She is alert and oriented to person, place, and time.   Skin: Skin is warm and dry.   Psychiatric: She has a normal mood and affect.   Vitals reviewed.      Assessment:       1. Hypertension goal BP (blood pressure) < 140/90    2. Seizure disorder    3. Bipolar disorder, unspecified    4. Cold intolerance    5. Impaired fasting glucose    6. Hypokalemia    7. Need for hepatitis C screening test        Plan:       Sera Almeida was seen today for annual exam.    Diagnoses and all orders for this visit:    Hypertension goal BP (blood pressure) < 140/90  -     amlodipine (NORVASC) 5 MG tablet; Take 1 tablet (5 mg total) by mouth once daily.    Seizure disorder  -     Ambulatory consult to Neurology  -     levetiracetam (KEPPRA) 1000 MG tablet; Take 1 tablet (1,000 mg total) by mouth 2 (two) times daily.    Bipolar disorder, unspecified  -     Recommend f/u with psychiatry    Cold intolerance  -     TSH; Future  -     Ferritin; Future  -     Iron and TIBC; Future    Impaired fasting glucose  -     Hemoglobin A1c; Future    Hypokalemia  -     Basic metabolic panel; Future    Need for hepatitis C screening test  -     Hepatitis C antibody; Future    Schedule labs.     RTC in 1 week for b/p check.

## 2017-06-02 NOTE — TELEPHONE ENCOUNTER
Pt was informed per Dr. Lay recommends 1 week follow up nurse visit to check blood pressure and start amlodipine as soon as possible pt verbalized understanding

## 2017-06-02 NOTE — TELEPHONE ENCOUNTER
----- Message from Rosario Lay DO sent at 6/2/2017  1:12 PM CDT -----  Please schedule 1 week f/u for nurse b/p check.   She needs to start amlodipine asap.

## 2017-07-20 ENCOUNTER — TELEPHONE (OUTPATIENT)
Dept: INTERNAL MEDICINE | Facility: CLINIC | Age: 52
End: 2017-07-20

## 2017-07-20 NOTE — TELEPHONE ENCOUNTER
Nurse spoke with daughter of Ms. Whelan. appt schedule for nurse visit BP check as ordered at last visit with Dr Lay. Pt will keep f/u hospital discharge per daughter. Pt was seen in hospital per daughter for psychiatric behavior.

## 2017-07-28 ENCOUNTER — CLINICAL SUPPORT (OUTPATIENT)
Dept: INTERNAL MEDICINE | Facility: CLINIC | Age: 52
End: 2017-07-28
Payer: MEDICAID

## 2017-07-28 VITALS — DIASTOLIC BLOOD PRESSURE: 82 MMHG | SYSTOLIC BLOOD PRESSURE: 138 MMHG

## 2017-07-28 RX ORDER — METOPROLOL TARTRATE 25 MG/1
25 TABLET, FILM COATED ORAL 2 TIMES DAILY
COMMUNITY
End: 2017-09-14 | Stop reason: SDUPTHER

## 2017-08-28 ENCOUNTER — TELEPHONE (OUTPATIENT)
Dept: INTERNAL MEDICINE | Facility: CLINIC | Age: 52
End: 2017-08-28

## 2017-08-28 NOTE — TELEPHONE ENCOUNTER
----- Message from Daisy Sauceda sent at 8/28/2017 12:13 PM CDT -----  Contact: Angel/daughter  Angel has some questions for the nurse regarding pt's appt that's scheduled for today. Pls call Angel back at [phone].

## 2017-08-30 ENCOUNTER — HOSPITAL ENCOUNTER (EMERGENCY)
Facility: HOSPITAL | Age: 52
Discharge: HOME | End: 2017-08-30
Attending: EMERGENCY MEDICINE
Payer: MEDICAID

## 2017-08-30 VITALS
TEMPERATURE: 98 F | DIASTOLIC BLOOD PRESSURE: 82 MMHG | OXYGEN SATURATION: 100 % | SYSTOLIC BLOOD PRESSURE: 157 MMHG | HEART RATE: 98 BPM | RESPIRATION RATE: 20 BRPM

## 2017-08-30 DIAGNOSIS — R56.9 SEIZURE-LIKE ACTIVITY: Primary | ICD-10-CM

## 2017-08-30 LAB
ALBUMIN SERPL BCP-MCNC: 3.7 G/DL
ALP SERPL-CCNC: 126 U/L
ALT SERPL W/O P-5'-P-CCNC: 17 U/L
ANION GAP SERPL CALC-SCNC: 15 MMOL/L
AST SERPL-CCNC: 22 U/L
BACTERIA #/AREA URNS HPF: NORMAL /HPF
BASOPHILS # BLD AUTO: 0.02 K/UL
BASOPHILS NFR BLD: 0.3 %
BILIRUB SERPL-MCNC: 0.4 MG/DL
BILIRUB UR QL STRIP: NEGATIVE
BUN SERPL-MCNC: 14 MG/DL
CALCIUM SERPL-MCNC: 10.3 MG/DL
CHLORIDE SERPL-SCNC: 106 MMOL/L
CLARITY UR: CLEAR
CO2 SERPL-SCNC: 24 MMOL/L
COLOR UR: YELLOW
CREAT SERPL-MCNC: 0.8 MG/DL
DIFFERENTIAL METHOD: ABNORMAL
EOSINOPHIL # BLD AUTO: 0.1 K/UL
EOSINOPHIL NFR BLD: 1 %
ERYTHROCYTE [DISTWIDTH] IN BLOOD BY AUTOMATED COUNT: 12.9 %
EST. GFR  (AFRICAN AMERICAN): >60 ML/MIN/1.73 M^2
EST. GFR  (NON AFRICAN AMERICAN): >60 ML/MIN/1.73 M^2
GLUCOSE SERPL-MCNC: 170 MG/DL
GLUCOSE UR QL STRIP: NEGATIVE
HCT VFR BLD AUTO: 36.5 %
HGB BLD-MCNC: 12.5 G/DL
HGB UR QL STRIP: NEGATIVE
HYALINE CASTS #/AREA URNS LPF: 0 /LPF
KETONES UR QL STRIP: NEGATIVE
LEUKOCYTE ESTERASE UR QL STRIP: NEGATIVE
LYMPHOCYTES # BLD AUTO: 1.1 K/UL
LYMPHOCYTES NFR BLD: 18.8 %
MCH RBC QN AUTO: 31.4 PG
MCHC RBC AUTO-ENTMCNC: 34.2 G/DL
MCV RBC AUTO: 92 FL
MICROSCOPIC COMMENT: NORMAL
MONOCYTES # BLD AUTO: 0.3 K/UL
MONOCYTES NFR BLD: 4.8 %
NEUTROPHILS # BLD AUTO: 4.4 K/UL
NEUTROPHILS NFR BLD: 75.1 %
NITRITE UR QL STRIP: NEGATIVE
PH UR STRIP: 7 [PH] (ref 5–8)
PLATELET # BLD AUTO: 287 K/UL
PMV BLD AUTO: 9.8 FL
POTASSIUM SERPL-SCNC: 3.7 MMOL/L
PROT SERPL-MCNC: 8.3 G/DL
PROT UR QL STRIP: ABNORMAL
RBC # BLD AUTO: 3.98 M/UL
RBC #/AREA URNS HPF: 2 /HPF (ref 0–4)
SODIUM SERPL-SCNC: 145 MMOL/L
SP GR UR STRIP: 1.02 (ref 1–1.03)
URN SPEC COLLECT METH UR: ABNORMAL
UROBILINOGEN UR STRIP-ACNC: NEGATIVE EU/DL
WBC # BLD AUTO: 5.8 K/UL
WBC #/AREA URNS HPF: 5 /HPF (ref 0–5)

## 2017-08-30 PROCEDURE — 99285 EMERGENCY DEPT VISIT HI MDM: CPT | Mod: 25

## 2017-08-30 PROCEDURE — 85025 COMPLETE CBC W/AUTO DIFF WBC: CPT

## 2017-08-30 PROCEDURE — 81000 URINALYSIS NONAUTO W/SCOPE: CPT

## 2017-08-30 PROCEDURE — 93005 ELECTROCARDIOGRAM TRACING: CPT

## 2017-08-30 PROCEDURE — 93010 ELECTROCARDIOGRAM REPORT: CPT | Mod: ,,, | Performed by: INTERNAL MEDICINE

## 2017-08-30 PROCEDURE — 80053 COMPREHEN METABOLIC PANEL: CPT

## 2017-08-30 NOTE — ED NOTES
Pt answered a few questions appropriately. States name. . Unable to give year. Attempts to follow verbal commands to move arms but has difficulty doing so. Unable to fully relax arms. Difficulty speaking.

## 2017-08-30 NOTE — ED PROVIDER NOTES
SCRIBE #1 NOTE: I, Darrel Barrett, am scribing for, and in the presence of, Sukumar Harris MD. I have scribed the entire note.      History      Chief Complaint   Patient presents with    Seizures     witnessed seizure PTA       Review of patient's allergies indicates:  No Known Allergies     HPI   HPI    8/30/2017, 12:37 PM   History obtained from the patient      History of Present Illness limited due to pt poor historian: Sera Whelan is a 51 y.o. female patient who presents to the Emergency Department for SZ which onset suddenly PTA. Sxs are episodic and moderate in severity. Pt states she is not on anyb seizure medication. There are no mitigating or exacerbating factors noted.  Pt denies any fever, N/V/D, fall, head trauma, tongue bite, and all other sxs at this time. No further complaints or concerns at this time.       Arrival mode: EMS    PCP: Primary Doctor No       Past Medical History:  Past Medical History:   Diagnosis Date    Bipolar 1 disorder     Diabetes mellitus     Encounter for blood transfusion     Hypertension     Seizures     Stroke        Past Surgical History:  unknown    Family History:  Family History   Problem Relation Age of Onset    Diabetes Mother     Stroke Mother        Social History:  Social History     Social History Main Topics    Smoking status: Never Smoker    Smokeless tobacco: Never Used    Alcohol use No    Drug use: No    Sexual activity: unknown       ROS   Review of Systems   Constitutional: Negative for fever.   HENT: Negative for sore throat.    Respiratory: Negative for shortness of breath.    Cardiovascular: Negative for chest pain.   Gastrointestinal: Negative for abdominal pain, constipation, diarrhea, nausea and vomiting.   Genitourinary: Negative for dysuria.   Musculoskeletal: Negative for back pain.   Skin: Negative for rash.   Neurological: Positive for seizures. Negative for weakness and numbness.   Hematological: Does not  bruise/bleed easily.     Physical Exam        Initial Vitals [08/30/17 1236]   BP Pulse Resp Temp SpO2   (!) 174/98 (!) 125 20 98.1 °F (36.7 °C) 98 %      MAP       123.33            Physical Exam  Nursing Notes and Vital Signs Reviewed.  Constitutional: Patient is in no acute distress. Well-developed and well-nourished.  Head: Atraumatic. Normocephalic.  Eyes: PERRL. EOM intact. Conjunctivae are not pale. No scleral icterus.  ENT: Mucous membranes are moist. Oropharynx is clear and symmetric.    Neck: Supple. Full ROM. No lymphadenopathy.  Cardiovascular: Regular rate. Regular rhythm. No murmurs, rubs, or gallops. Distal pulses are 2+ and symmetric.  Pulmonary/Chest: No respiratory distress. Clear to auscultation bilaterally. No wheezing, rales, or rhonchi.  Abdominal: Soft and non-distended.  There is no tenderness.  No rebound, guarding, or rigidity. Good bowel sounds.  Genitourinary: No CVA tenderness  Musculoskeletal: Chronic contracture to RUE. No edema. No calf tenderness.  Skin: Warm and dry.  Neurological:  Alert, awake, and appropriate.  Normal speech.  No acute focal neurological deficits are appreciated.  Psychiatric: Normal affect. Good eye contact. Appropriate in content.    ED Course    Procedures  ED Vital Signs:  Vitals:    08/30/17 1236 08/30/17 1422 08/30/17 1432 08/30/17 1539   BP: (!) 174/98 (!) 141/100 (!) 151/48 (!) 156/87   Pulse: (!) 125 103 99 96   Resp: 20 20 19 20   Temp: 98.1 °F (36.7 °C)      TempSrc: Oral      SpO2: 98% 100% 99% 97%    08/30/17 1655   BP: (!) 157/82   Pulse: 98   Resp: 20   Temp:    TempSrc:    SpO2: 100%       Abnormal Lab Results:  Labs Reviewed   CBC W/ AUTO DIFFERENTIAL - Abnormal; Notable for the following:        Result Value    RBC 3.98 (*)     Hematocrit 36.5 (*)     MCH 31.4 (*)     Gran% 75.1 (*)     All other components within normal limits   COMPREHENSIVE METABOLIC PANEL - Abnormal; Notable for the following:     Glucose 170 (*)     All other components  within normal limits   URINALYSIS - Abnormal; Notable for the following:     Protein, UA 2+ (*)     All other components within normal limits   URINALYSIS MICROSCOPIC        All Lab Results:  Results for orders placed or performed during the hospital encounter of 08/30/17   CBC auto differential   Result Value Ref Range    WBC 5.80 3.90 - 12.70 K/uL    RBC 3.98 (L) 4.00 - 5.40 M/uL    Hemoglobin 12.5 12.0 - 16.0 g/dL    Hematocrit 36.5 (L) 37.0 - 48.5 %    MCV 92 82 - 98 fL    MCH 31.4 (H) 27.0 - 31.0 pg    MCHC 34.2 32.0 - 36.0 g/dL    RDW 12.9 11.5 - 14.5 %    Platelets 287 150 - 350 K/uL    MPV 9.8 9.2 - 12.9 fL    Gran # 4.4 1.8 - 7.7 K/uL    Lymph # 1.1 1.0 - 4.8 K/uL    Mono # 0.3 0.3 - 1.0 K/uL    Eos # 0.1 0.0 - 0.5 K/uL    Baso # 0.02 0.00 - 0.20 K/uL    Gran% 75.1 (H) 38.0 - 73.0 %    Lymph% 18.8 18.0 - 48.0 %    Mono% 4.8 4.0 - 15.0 %    Eosinophil% 1.0 0.0 - 8.0 %    Basophil% 0.3 0.0 - 1.9 %    Differential Method Automated    Comprehensive metabolic panel   Result Value Ref Range    Sodium 145 136 - 145 mmol/L    Potassium 3.7 3.5 - 5.1 mmol/L    Chloride 106 95 - 110 mmol/L    CO2 24 23 - 29 mmol/L    Glucose 170 (H) 70 - 110 mg/dL    BUN, Bld 14 6 - 20 mg/dL    Creatinine 0.8 0.5 - 1.4 mg/dL    Calcium 10.3 8.7 - 10.5 mg/dL    Total Protein 8.3 6.0 - 8.4 g/dL    Albumin 3.7 3.5 - 5.2 g/dL    Total Bilirubin 0.4 0.1 - 1.0 mg/dL    Alkaline Phosphatase 126 55 - 135 U/L    AST 22 10 - 40 U/L    ALT 17 10 - 44 U/L    Anion Gap 15 8 - 16 mmol/L    eGFR if African American >60 >60 mL/min/1.73 m^2    eGFR if non African American >60 >60 mL/min/1.73 m^2   Urinalysis   Result Value Ref Range    Specimen UA Urine, Catheterized     Color, UA Yellow Yellow, Straw, Arianna    Appearance, UA Clear Clear    pH, UA 7.0 5.0 - 8.0    Specific Gravity, UA 1.020 1.005 - 1.030    Protein, UA 2+ (A) Negative    Glucose, UA Negative Negative    Ketones, UA Negative Negative    Bilirubin (UA) Negative Negative    Occult Blood  UA Negative Negative    Nitrite, UA Negative Negative    Urobilinogen, UA Negative <2.0 EU/dL    Leukocytes, UA Negative Negative   Urinalysis Microscopic   Result Value Ref Range    RBC, UA 2 0 - 4 /hpf    WBC, UA 5 0 - 5 /hpf    Bacteria, UA Occasional None-Occ /hpf    Hyaline Casts, UA 0 0-1/lpf /lpf    Microscopic Comment SEE COMMENT          Imaging Results:  Imaging Results          CT Head Without Contrast (Final result)  Result time 08/30/17 15:24:39    Final result by Oh Hutchins MD (08/30/17 15:24:39)                 Impression:      No acute intracranial process.  Mild to moderate atrophy.    Old right frontal and left occipitoparietal areas of encephalomalacia or infarcts.        All CT scans at this facility use dose modulation, iterative reconstruction and/or weight based dosing when appropriate to reduce radiation dose to as low as reasonably achievable.       Electronically signed by: OH HUTCHINS MD  Date:     08/30/17  Time:    15:24              Narrative:    CT HEAD WITHOUT CONTRAST     History:  seizure    Technique:  Noncontrast CT of the brain. Comparison with May 2017.    Findings:  The ventricles are dilated consistent with generalized atrophy. Associated age-related white matter degeneration is present.     There is a moderate-sized area of encephalomalacia involving the right frontal lobe.  Small zone of encephalomalacia left posterior hemisphere or parietal/occipital pole.  No change is noted.    No acute findings.    Diffuse calvarial thickening is noted.                             X-Ray Chest AP Portable (Final result)  Result time 08/30/17 13:53:58    Final result by PHOEBE Godoy Sr., MD (08/30/17 13:53:58)                 Impression:        1. The current examination is limited secondary to patient rotation to the left.   2. The lungs are clear.   3. An inferior vena cava filter is projected over the lumbar spine.      Electronically signed by: PHOEBE GODOY MD  Date:      08/30/17  Time:    13:53              Narrative:    One-view chest x-ray    Clinical History: Seizure    Finding: Comparison was made to a prior examination performed on 5/8/2017. The current examination is limited secondary to patient rotation to the left. The size of the heart is normal. The lungs are clear. There is no pneumothorax.  The costophrenic angles are sharp. An inferior vena cava filter is projected over the lumbar spine.                                    The EKG was ordered, reviewed, and independently interpreted by the ED provider.  Interpretation time: 12:53  Rate: 118 BPM  Rhythm: acclerated junctional  Interpretation: No acute ST changes. No STEMI.      The Emergency Provider reviewed the vital signs and test results, which are outlined above.    ED Discussion     3:38 PM: Reassessed pt. Pt states their condition has improved at this time. Discussed with pt all pertinent ED information and results. Discussed plan of treatment with pt. Gave pt all f/u and return to the ED instructions. All questions and concerns were addressed at this time. Pt understands and agrees to plan as discussed. Pt is stable for discharge.     Patient presents with seizure or seizure-like symptoms. I have no suspicion of an intracranial, traumatic, infectious, metabolic, toxic, cardiovascular (specifically arrhythmia), or other emergent medical condition requiring further intervention. Seizure precautions were discussed with the patient and/or caretaker, specifically not to swim unattended, not to operate motor vehicles or other machinery, and to avoid heights or other areas where falls may occur until cleared by primary care physician. Patient is safe for discharge.      ED Medication(s):  Medications - No data to display    Discharge Medication List as of 8/30/2017  3:36 PM          Follow-up Information     [facility] in 2 days.    Contact information:  [address]  [address]  [phone]                     Medical Decision Making    Medical Decision Making:   Clinical Tests:   Lab Tests: Reviewed and Ordered  Radiological Study: Reviewed and Ordered  Medical Tests: Reviewed and Ordered           Scribe Attestation:   Scribe #1: I performed the above scribed service and the documentation accurately describes the services I performed. I attest to the accuracy of the note.    Attending:   Physician Attestation Statement for Scribe #1: I, Sukumar Harris MD, personally performed the services described in this documentation, as scribed by Darrel Barrett, in my presence, and it is both accurate and complete.          Clinical Impression       ICD-10-CM ICD-9-CM   1. Seizure-like activity R56.9 780.39       Disposition:   Disposition: Discharged  Condition: Stable         Sukumar Harris MD  08/30/17 5111

## 2017-09-11 DIAGNOSIS — I10 HYPERTENSION GOAL BP (BLOOD PRESSURE) < 140/90: ICD-10-CM

## 2017-09-11 NOTE — TELEPHONE ENCOUNTER
----- Message from Nicole Booker sent at 9/7/2017  1:22 PM CDT -----  Contact: pt   Pt needs a refill on her blood pressure medication,,, please call because pt doesn't remember pt requesting a call back at [phone]

## 2017-09-13 RX ORDER — NAPROXEN SODIUM 220 MG/1
81 TABLET, FILM COATED ORAL DAILY
Qty: 30 TABLET | Refills: 3 | Status: SHIPPED | OUTPATIENT
Start: 2017-09-13 | End: 2017-09-14 | Stop reason: SDUPTHER

## 2017-09-13 RX ORDER — AMLODIPINE BESYLATE 5 MG/1
5 TABLET ORAL DAILY
Qty: 30 TABLET | Refills: 3 | Status: SHIPPED | OUTPATIENT
Start: 2017-09-13 | End: 2017-09-14 | Stop reason: SDUPTHER

## 2017-09-14 ENCOUNTER — OFFICE VISIT (OUTPATIENT)
Dept: INTERNAL MEDICINE | Facility: CLINIC | Age: 52
End: 2017-09-14
Payer: MEDICAID

## 2017-09-14 VITALS
DIASTOLIC BLOOD PRESSURE: 84 MMHG | WEIGHT: 107.13 LBS | HEIGHT: 65 IN | SYSTOLIC BLOOD PRESSURE: 114 MMHG | HEART RATE: 100 BPM | TEMPERATURE: 99 F | BODY MASS INDEX: 17.85 KG/M2

## 2017-09-14 DIAGNOSIS — T50.905S MEDICATION SIDE EFFECTS PRESENT, SEQUELA: ICD-10-CM

## 2017-09-14 DIAGNOSIS — Z12.11 SCREENING FOR COLON CANCER: Primary | ICD-10-CM

## 2017-09-14 DIAGNOSIS — I10 HYPERTENSION GOAL BP (BLOOD PRESSURE) < 140/90: ICD-10-CM

## 2017-09-14 DIAGNOSIS — G40.909 SEIZURE DISORDER: ICD-10-CM

## 2017-09-14 DIAGNOSIS — R73.09 BLOOD GLUCOSE ABNORMAL: ICD-10-CM

## 2017-09-14 DIAGNOSIS — Z12.39 SCREENING FOR BREAST CANCER: ICD-10-CM

## 2017-09-14 DIAGNOSIS — Z86.39 HISTORY OF DIABETES MELLITUS, TYPE II: ICD-10-CM

## 2017-09-14 DIAGNOSIS — Z13.220 SCREENING FOR CHOLESTEROL LEVEL: ICD-10-CM

## 2017-09-14 PROCEDURE — 99213 OFFICE O/P EST LOW 20 MIN: CPT | Mod: PBBFAC | Performed by: FAMILY MEDICINE

## 2017-09-14 PROCEDURE — 3008F BODY MASS INDEX DOCD: CPT | Mod: ,,, | Performed by: FAMILY MEDICINE

## 2017-09-14 PROCEDURE — 99214 OFFICE O/P EST MOD 30 MIN: CPT | Mod: S$PBB,,, | Performed by: FAMILY MEDICINE

## 2017-09-14 PROCEDURE — 3074F SYST BP LT 130 MM HG: CPT | Mod: ,,, | Performed by: FAMILY MEDICINE

## 2017-09-14 PROCEDURE — 99999 PR PBB SHADOW E&M-EST. PATIENT-LVL III: CPT | Mod: PBBFAC,,, | Performed by: FAMILY MEDICINE

## 2017-09-14 PROCEDURE — 3079F DIAST BP 80-89 MM HG: CPT | Mod: ,,, | Performed by: FAMILY MEDICINE

## 2017-09-14 RX ORDER — METOPROLOL TARTRATE 25 MG/1
25 TABLET, FILM COATED ORAL 2 TIMES DAILY
Qty: 60 TABLET | Refills: 3 | Status: SHIPPED | OUTPATIENT
Start: 2017-09-14 | End: 2017-12-22 | Stop reason: SDUPTHER

## 2017-09-14 RX ORDER — AMLODIPINE BESYLATE 5 MG/1
5 TABLET ORAL DAILY
Qty: 30 TABLET | Refills: 3 | Status: SHIPPED | OUTPATIENT
Start: 2017-09-14 | End: 2017-12-22 | Stop reason: SDUPTHER

## 2017-09-14 RX ORDER — NAPROXEN SODIUM 220 MG/1
81 TABLET, FILM COATED ORAL DAILY
Qty: 30 TABLET | Refills: 3 | Status: SHIPPED | OUTPATIENT
Start: 2017-09-14 | End: 2020-01-13

## 2017-09-14 RX ORDER — LEVETIRACETAM 1000 MG/1
1000 TABLET ORAL 2 TIMES DAILY
Qty: 60 TABLET | Refills: 3 | Status: SHIPPED | OUTPATIENT
Start: 2017-09-14 | End: 2017-12-22 | Stop reason: SDUPTHER

## 2017-09-14 NOTE — PROGRESS NOTES
Subjective:       Patient ID: Sera Whelan is a 51 y.o. female.    Chief Complaint: Medication Refill    50 yo F w/ pMH of schizophrenia, bipolar, HTN presents for medication refill. She is out of her blood pressure medicine. Blood pressure controlled. No CP, SOB.  Patient has had abnormal movements for about a year.  Psych told daughter this has been caused by side effect of anti-psychotic.    Psychiatrist follows her. Prescribing anti-psychotics and benzotropine.        Medication Refill   Pertinent negatives include no arthralgias, chest pain, chills, congestion, coughing, fever, myalgias, nausea, vomiting or weakness.     Review of Systems   Constitutional: Negative for activity change, appetite change, chills, fever and unexpected weight change.        Has noted slight weight change.   HENT: Negative for congestion, sinus pressure and voice change.    Eyes: Negative for pain and visual disturbance.   Respiratory: Negative for cough, chest tightness and shortness of breath.    Cardiovascular: Negative for chest pain, palpitations and leg swelling.   Gastrointestinal: Negative for blood in stool, constipation, diarrhea, nausea and vomiting.   Genitourinary: Negative for difficulty urinating and dysuria.   Musculoskeletal: Negative for arthralgias, gait problem and myalgias.   Skin: Negative for color change and wound.   Neurological: Negative for dizziness, weakness and light-headedness.   Hematological: Does not bruise/bleed easily.   Psychiatric/Behavioral: Negative for agitation. The patient is not nervous/anxious.        Objective:      Physical Exam   Constitutional: She is oriented to person, place, and time. She appears well-developed and well-nourished. She does not have a sickly appearance. No distress.   Quite thin   HENT:   Head: Normocephalic and atraumatic.   Right Ear: External ear normal.   Left Ear: External ear normal.   Noted waxy build up bilaterally.   Eyes: Conjunctivae, EOM and lids are  normal.   Neck: Trachea normal, normal range of motion and full passive range of motion without pain.   Cardiovascular: Normal rate, regular rhythm, normal heart sounds and intact distal pulses.    Pulmonary/Chest: Effort normal and breath sounds normal. No stridor. No respiratory distress.   Abdominal: Soft. Normal appearance.   Musculoskeletal: Normal range of motion.   Lymphadenopathy:     She has no cervical adenopathy.   Neurological: She is alert and oriented to person, place, and time. She is not disoriented.   + EPS symptoms - head/ torso is rolling and rocking. Mild. No mouth movements appreciated.   Skin: Skin is warm, dry and intact. No rash noted. She is not diaphoretic.   Psychiatric: She has a normal mood and affect. Her speech is normal and behavior is normal. Thought content normal.       Assessment:       1. Screening for colon cancer    2. Hypertension goal BP (blood pressure) < 140/90    3. Seizure disorder    4. Screening for breast cancer    5. Screening for cholesterol level    6. Blood glucose abnormal    7. History of diabetes mellitus, type II    8. Medication side effects present, sequela        Plan:   Screening for colon cancer  Sending for colonoscopy. Has not had one in past.    Hypertension goal BP (blood pressure) < 140/90    Blood pressure is normotensive today - patient presents for medication refill. She has two BP meds on chart. Plan on refilling metoprolol only - for BP control ( HR was 100). As I want to avoid hypotensive episodes holding amlodipine unless needed. Patient and daughter to check blood pressure at home.     Seizure disorder  Refill Keppra    Screening for breast cancer  Mammogram    Screening for cholesterol level    Check Cholesterol. Plan on starting a statin - given she has had stroke in past. Obtain lipids beforehand to evaluate levels.    Blood glucose abnormal    Noted normal A1c in may, however abnormal glucose on ER visit - check A1c. If still normal -  consider non-diabetic.    History of diabetes mellitus, type II     Noted normal A1c in may, however abnormal glucose on ER visit - check A1c. If still normal - consider non-diabetic.      EPS side effects - antipsychotics  On benzotropine.  Consider benadryl. To discuss w/ psychiatrist.    Follow up in 3 months - ensure BP controlled still and for health maintenance.

## 2017-09-14 NOTE — PATIENT INSTRUCTIONS
Dr. Brooks instructions:    Blood pressure.    You have 2 medicine for blood pressure on list. For now lets only use metoprolol as your blood pressure is normal off medications and I don't want your blood pressure to drop too low. PLease obtain a home monitor and check blood pressure at home. If it goes about 130/90 we will restart amlodipine.    Side effects from schizophrenia medicine  Continue benzotropine.  Talk to psychiatrist about starting benadryl as well.    Scheduling mammogram and colonoscopy today.    Checking blood sugar level today to ensure not diabetic.    Follow up in 3 months with me for health maintenance and review of blood pressure.    We will likely be starting cholesterol medicine after checking cholesterol blood work given you have had a stroke in the past.  Controlling High Blood Pressure  High blood pressure (hypertension) is often called the silent killer. This is because many people who have it dont know it. High blood pressure is defined as 140/90 mm Hg or higher. Know your blood pressure and remember to check it regularly. Doing so can save your life. Here are some things you can do to help control your blood pressure.    Choose heart-healthy foods  · Select low-salt, low-fat foods. Limit sodium intake to 2,400 mg per day or the amount suggested by your healthcare provider.  · Limit canned, dried, cured, packaged, and fast foods. These can contain a lot of salt.  · Eat 8 to 10 servings of fruits and vegetables every day.  · Choose lean meats, fish, or chicken.  · Eat whole-grain pasta, brown rice, and beans.  · Eat 2 to 3 servings of low-fat or fat-free dairy products.  · Ask your doctor about the DASH eating plan. This plan helps reduce blood pressure.  · When you go to a restaurant, ask that your meal be prepared with no added salt.  Maintain a healthy weight  · Ask your healthcare provider how many calories to eat a day. Then stick to that number.  · Ask your healthcare provider what  weight range is healthiest for you. If you are overweight, a weight loss of only 3% to 5% of your body weight can help lower blood pressure. Generally, a good weight loss goal is to lose 10% of your body weight in a year.  · Limit snacks and sweets.  · Get regular exercise.  Get up and get active  · Choose activities you enjoy. Find ones you can do with friends or family. This includes bicycling, dancing, walking, and jogging.  · Park farther away from building entrances.  · Use stairs instead of the elevator.  · When you can, walk or bike instead of driving.  · Independence leaves, garden, or do household repairs.  · Be active at a moderate to vigorous level of physical activity for at least 40 minutes for a minimum of 3 to 4 days a week.   Manage stress  · Make time to relax and enjoy life. Find time to laugh.  · Communicate your concerns with your loved ones and your healthcare provider.  · Visit with family and friends, and keep up with hobbies.  Limit alcohol and quit smoking  · Men should have no more than 2 drinks per day.  · Women should have no more than 1 drink per day.  · Talk with your healthcare provider about quitting smoking. Smoking significantly increases your risk for heart disease and stroke. Ask your healthcare provider about community smoking cessation programs and other options.  Medicines  If lifestyle changes arent enough, your healthcare provider may prescribe high blood pressure medicine. Take all medicines as prescribed. If you have any questions about your medicines, ask your healthcare provider before stopping or changing them.   Date Last Reviewed: 4/27/2016  © 7635-2369 [employer]. [address] [place] [address], [place] [address]. All rights reserved. This information is not intended as a substitute for professional medical care. Always follow your healthcare professional's instructions.

## 2017-09-25 ENCOUNTER — OFFICE VISIT (OUTPATIENT)
Dept: INTERNAL MEDICINE | Facility: CLINIC | Age: 52
End: 2017-09-25
Payer: MEDICAID

## 2017-09-25 VITALS
OXYGEN SATURATION: 95 % | HEIGHT: 65 IN | TEMPERATURE: 97 F | SYSTOLIC BLOOD PRESSURE: 132 MMHG | DIASTOLIC BLOOD PRESSURE: 80 MMHG | HEART RATE: 63 BPM | BODY MASS INDEX: 16.97 KG/M2 | WEIGHT: 101.88 LBS

## 2017-09-25 DIAGNOSIS — Z12.11 COLON CANCER SCREENING: ICD-10-CM

## 2017-09-25 DIAGNOSIS — G40.909 SEIZURE DISORDER: ICD-10-CM

## 2017-09-25 DIAGNOSIS — E11.9 DIET-CONTROLLED DIABETES MELLITUS: ICD-10-CM

## 2017-09-25 DIAGNOSIS — I10 HYPERTENSION GOAL BP (BLOOD PRESSURE) < 140/90: Primary | ICD-10-CM

## 2017-09-25 DIAGNOSIS — Z23 IMMUNIZATION DUE: ICD-10-CM

## 2017-09-25 PROCEDURE — 90471 IMMUNIZATION ADMIN: CPT | Mod: PBBFAC

## 2017-09-25 PROCEDURE — 3008F BODY MASS INDEX DOCD: CPT | Mod: ,,, | Performed by: INTERNAL MEDICINE

## 2017-09-25 PROCEDURE — 99999 PR PBB SHADOW E&M-EST. PATIENT-LVL III: CPT | Mod: PBBFAC,,, | Performed by: INTERNAL MEDICINE

## 2017-09-25 PROCEDURE — 3075F SYST BP GE 130 - 139MM HG: CPT | Mod: ,,, | Performed by: INTERNAL MEDICINE

## 2017-09-25 PROCEDURE — 90715 TDAP VACCINE 7 YRS/> IM: CPT | Mod: PBBFAC

## 2017-09-25 PROCEDURE — 99214 OFFICE O/P EST MOD 30 MIN: CPT | Mod: S$PBB,,, | Performed by: INTERNAL MEDICINE

## 2017-09-25 PROCEDURE — 99213 OFFICE O/P EST LOW 20 MIN: CPT | Mod: PBBFAC | Performed by: INTERNAL MEDICINE

## 2017-09-25 PROCEDURE — 3079F DIAST BP 80-89 MM HG: CPT | Mod: ,,, | Performed by: INTERNAL MEDICINE

## 2017-09-25 NOTE — PROGRESS NOTES
Subjective:       Patient ID: Sera Whelan is a 51 y.o. female.    Chief Complaint: Follow-up    Sera Whelan  51 y.o. Black or  female    Patient presents with:  Follow-up    HPI: Here today to follow up on chronic conditions. She is in the exam room alone but her son is in the waiting room.   HTN--stable on amlodipine and metoprolol. She denies symptoms.   Diabetes--diet controlled.                       HGBA1C                   5.5                 05/31/2017            Seizure disorder--she reports compliance with Keppra. She has an upcoming appointment with neurology.     Past Medical History:  Bipolar 1 disorder  Diabetes mellitus  Encounter for blood transfusion  Hypertension  Seizures  Stroke    Current Outpatient Prescriptions on File Prior to Visit:  amlodipine (NORVASC) 5 MG tablet, Take 1 tablet (5 mg total) by mouth once daily., Disp: 30 tablet, Rfl: 3  aspirin 81 MG Chew, Take 1 tablet (81 mg total) by mouth once daily., Disp: 30 tablet, Rfl: 3  benztropine (COGENTIN) 1 MG tablet, Take 1 mg by mouth 2 (two) times daily., Disp: , Rfl:   ferrous sulfate 325 (65 FE) MG EC tablet, Take 1 tablet (325 mg total) by mouth 2 (two) times daily., Disp: 60 tablet, Rfl: 2  levetiracetam (KEPPRA) 1000 MG tablet, Take 1 tablet (1,000 mg total) by mouth 2 (two) times daily., Disp: 60 tablet, Rfl: 3  metoprolol tartrate (LOPRESSOR) 25 MG tablet, Take 1 tablet (25 mg total) by mouth 2 (two) times daily., Disp: 60 tablet, Rfl: 3  perphenazine (TRILAFON) 4 MG tablet, Take 4 mg by mouth 2 (two) times daily., Disp: , Rfl:   potassium chloride SA (K-DUR,KLOR-CON) 20 MEQ tablet, Take 1 tablet (20 mEq total) by mouth once daily., Disp: 20 tablet, Rfl: 0  risperidone (RISPERDAL) 3 MG Tab, Take 3 mg by mouth 2 (two) times daily. , Disp: , Rfl:   sertraline (ZOLOFT) 50 MG tablet, Take 50 mg by mouth once daily., Disp: , Rfl:     Allergies:  Review of patient's allergies indicates:  No Known  Allergies        Review of Systems   Constitutional: Negative for fever and unexpected weight change.   Respiratory: Negative for shortness of breath.    Cardiovascular: Negative for chest pain and leg swelling.   Gastrointestinal: Negative for abdominal pain.   Genitourinary: Negative for dysuria.   Neurological: Positive for seizures. Negative for dizziness and headaches.       Objective:      Physical Exam   Constitutional: She is oriented to person, place, and time. She appears well-developed and well-nourished. No distress.   Eyes: No scleral icterus.   Neck: No tracheal deviation present.   Cardiovascular: Normal rate.    Pulses:       Dorsalis pedis pulses are 2+ on the right side, and 2+ on the left side.   Pulmonary/Chest: Effort normal. No respiratory distress.   Abdominal: Bowel sounds are normal.   Musculoskeletal: She exhibits no edema.   Feet:   Right Foot:   Protective Sensation: 4 sites tested. 4 sites sensed.   Skin Integrity: Positive for callus and dry skin. Negative for ulcer.   Left Foot:   Protective Sensation: 4 sites tested.   Skin Integrity: Positive for callus and dry skin. Negative for ulcer.   Neurological: She is alert and oriented to person, place, and time.   Skin: Skin is warm and dry.   Psychiatric: She has a normal mood and affect.   Vitals reviewed.      Assessment:       1. Hypertension goal BP (blood pressure) < 140/90    2. Diet-controlled diabetes mellitus    3. Seizure disorder    4. Colon cancer screening    5. Immunization due        Plan:       Sera Almeida was seen today for follow-up.    Diagnoses and all orders for this visit:    Hypertension goal BP (blood pressure) < 140/90  -     Continue amlodipine and metoprolol  -     Comprehensive metabolic panel; Standing  -     Lipid panel; Standing    Diet-controlled diabetes mellitus  -     Comprehensive metabolic panel; Standing  -     Hemoglobin A1c; Standing  -     Lipid panel; Standing    Seizure disorder  -     F/U with  neurology    Colon cancer screening  -     Case request GI: COLONOSCOPY    Immunization due  -     (In Office Administered) Tdap Vaccine    Schedule mammogram.     Labs and f/u in 6 months and as needed.

## 2017-09-29 ENCOUNTER — OFFICE VISIT (OUTPATIENT)
Dept: NEUROLOGY | Facility: CLINIC | Age: 52
End: 2017-09-29
Payer: MEDICAID

## 2017-09-29 VITALS
HEIGHT: 65 IN | BODY MASS INDEX: 17.89 KG/M2 | HEART RATE: 64 BPM | WEIGHT: 107.38 LBS | SYSTOLIC BLOOD PRESSURE: 128 MMHG | DIASTOLIC BLOOD PRESSURE: 70 MMHG

## 2017-09-29 DIAGNOSIS — I69.30 H/O: STROKE WITH RESIDUAL EFFECTS: Primary | ICD-10-CM

## 2017-09-29 DIAGNOSIS — R56.9 SEIZURE: ICD-10-CM

## 2017-09-29 PROCEDURE — 99999 PR PBB SHADOW E&M-EST. PATIENT-LVL III: CPT | Mod: PBBFAC,,, | Performed by: PSYCHIATRY & NEUROLOGY

## 2017-09-29 PROCEDURE — 99204 OFFICE O/P NEW MOD 45 MIN: CPT | Mod: S$PBB,,, | Performed by: PSYCHIATRY & NEUROLOGY

## 2017-09-29 PROCEDURE — 3074F SYST BP LT 130 MM HG: CPT | Mod: ,,, | Performed by: PSYCHIATRY & NEUROLOGY

## 2017-09-29 PROCEDURE — 3078F DIAST BP <80 MM HG: CPT | Mod: ,,, | Performed by: PSYCHIATRY & NEUROLOGY

## 2017-09-29 PROCEDURE — 99213 OFFICE O/P EST LOW 20 MIN: CPT | Mod: PBBFAC | Performed by: PSYCHIATRY & NEUROLOGY

## 2017-09-29 PROCEDURE — 3008F BODY MASS INDEX DOCD: CPT | Mod: ,,, | Performed by: PSYCHIATRY & NEUROLOGY

## 2017-09-29 NOTE — LETTER
September 29, 2017      Rosario Lay DO  [address] [place] [address] [address] [place] [id number]           [facility]  [address] [place] [address] [address] [place] [address]  Phone: [phone]  Fax: [phone]          Patient: Sera Whelan   MR Number: [id number]   YOB: 1965   Date of Visit: 9/29/2017       Dear Dr. Rosario Lay:    Thank you for referring Sera Whelan to me for evaluation. Attached you will find relevant portions of my assessment and plan of care.    If you have questions, please do not hesitate to call me. I look forward to following Sera Whelan along with you.    Sincerely,    Crow Bryan MD    Enclosure  CC:  No Recipients    If you would like to receive this communication electronically, please contact [email or web address] or [phone] to request more information on itravel Link access.    For providers and/or their staff who would like to refer a patient to [facility], please contact us through our one-stop-shop provider referral line, [facility], at [phone].    If you feel you have received this communication in error or would no longer like to receive these types of communications, please e-mail [email or web address]

## 2017-09-29 NOTE — PROGRESS NOTES
This 51-year-old right-handed patient presents with family members who indicates that the patient has a history of seizure.  The seizure is described as occurring 3-4 times a year with the last seizure occurring about 1 month ago.    The patient does not give much history, but the family indicates that she has complained of a funny feeling just before the onset of the seizure event.  The family was with her indicates that they note the mouth pulled to one side although not certain whether his right or left.  This is then followed by a fall and loss of muscle tone with then a well described tonic-clonic activity.  She experiences increased salivation but no incontinence.  She has not had any noticed tongue biting.  The duration of the tonic-clonic activity is not known but estimated to be anywhere from 2-5 minutes at a time.  This is then followed by postictal sleeping and drowsiness for several hours.  The patient does indicate that she has headache afterwards.  She denies any emesis.    The etiology of the seizures presumed to be from a previous stroke that occurred about 10 years ago producing a left sided weakness.  The patient continues to have some mild residual deficits from the stroke with awkward movement of the left hand and left arm.  She is otherwise independent for activities of daily living but requires supervision because of her history of bipolar disorder.      ROS:  GENERAL: No fever, chills, fatigability or weight loss.  SKIN: No rashes, itching or changes in color or texture of skin.  HEAD: No headaches or recent head trauma.  EYES: Visual acuity fine. No photophobia, ocular pain or diplopia.  EARS: Denies ear pain, discharge or vertigo.  NOSE: No loss of smell, no epistaxis or postnasal drip.  MOUTH & THROAT: No hoarseness or change in voice. No excessive gum bleeding.  NODES: Denies swollen glands.  CHEST: Denies SHAY, cyanosis, wheezing, cough and sputum production.  CARDIOVASCULAR: Denies chest  pain, PND, orthopnea or reduced exercise tolerance.  ABDOMEN: Appetite fine. No weight loss. Denies diarrhea, abdominal pain, hematemesis or blood in stool.  URINARY: No flank pain, dysuria or hematuria.  PERIPHERAL VASCULAR: No claudication or cyanosis.  MUSCULOSKELETAL: No joint stiffness or swelling. Denies back pain.  NEUROLOGIC: See comments in present illness.  The patient's family states that they note an occasional involuntary movement described as uncontrolled hand wringing or facial twisting.    PAST HISTORY:  Surgery: No surgical procedures  Medical: History of bipolar 1 disorder, diabetes mellitus, diet-controlled, hypertension, prior history of stroke  ALLERGIES: NO KNOWN DRUG ALLERGIES    FAMILY HISTORY:  The patient's mother is  as a result of complications of peripheral vascular disease, hypertension, and diabetes.  She does not know her father's medical history.  The family does not know of any other family members with seizures.    SOCIAL HISTORY:  The patient is single and lives with her family.  She does require supervision for activities of daily living apparently because of her bipolar 1 disorder and medication side effects    PE:   VITAL SIGNS: Blood pressure 128/70, pulse 64, weight 48.7 kg, height 5 foot 5 inches, BMI 17.87  APPEARANCE: Well nourished, well developed, in no acute distress.  The patient is very quiet and offers very little spontaneous speech but does follow one-step commands.    HEAD: Normocephalic, atraumatic.  EYES: PERRL. EOMI.  Non-icteric sclerae.    EARS: TM's intact. Light reflex normal. No retraction or perforation.    NOSE: Mucosa pink. Airway clear.  MOUTH & THROAT: No tonsillar enlargement. No pharyngeal erythema or exudate. No stridor.  NECK: Supple. No bruits.  CHEST: Lungs clear to auscultation.  CARDIOVASCULAR: Regular rhythm without significant murmurs.  ABDOMEN: Bowel sounds normal. Not distended. Soft.  MUSCULOSKELETAL:  No bony deformity seen.   Muscle tone is increased on the left at the elbow and wrist and in the fingers.  The patient has active movement on the left side but has a tendency for the fingers to curl when at rest.  I did not see any other involuntary movements.  Specifically, there was no chorea or athetosis on today's visit.  NEUROLOGIC:   Mental Status:   The patient is attentive to the environment and cooperative for the exam.  The patient is able to follow one-step commands but requires frequent verbal cues.  She otherwise did not offer any spontaneous speech and did not answer orientation questions.  Cranial Nerves: II-XII grossly intact. Fundoscopic exam is normal.  No hemorrhage, exudate or papilledema is present. The extraocular muscles are intact in the cardinal directions of gaze.  No ptosis is present. Facial features are symmetrical.  Speech is normal in fluency, diction, and phrasing.  Tongue protrudes in the midline.    Gait and Station:  Romberg is negative.  Good alternate armswing with normal gait.  Motor:  No downdrift of either arm when held at shoulder level.  Manual muscle testing of proximal and distal muscles of both upper and lower extremities is normal.   Sensory:  Intact both upper and lower extremities to pin prick, touch, and vibration.  Cerebellar:  Finger to nose done well.  No resting involuntary movements or tremor were present.  Reflexes:  Stretch reflexes are asymmetrical and increased on the left as compared to that on the right particularly at the knee.  No clonus is present.  Plantar stimulation is flexor bilaterally and no pathological reflexes are seen      ASSESSMENT:  1.  Epilepsy, generalized seizures with focal onset secondary to prior history of stroke  2.  Late effects of stroke  3.  History of bipolar disorder, type I  4.  History of essential hypertension, well controlled with current medications    DISCUSSION:  The patient should continue Keppra 1000 mg twice a day which has been effective in  controlling her seizure frequency.  She apparently has not had any noticed side effect from the medication.  The family was instructed on first aid in seizure.  Questions were answered to their satisfaction.    RECOMMENDATIONS:  1.  Continue Keppra 1000 mg twice a day  2.  Return to neurology twice a year for routine follow-up unless seizure frequency changes.    This was a 50 minute visit with over 50% of the time spent counseling the family regarding the emergent treatment of seizure at home as well as when to offer assistance.  Medications were discussed also.    This note is generated with speech recognition software and is subject to transcription error and sound alike phrases that may be missed by proofreading.

## 2017-12-22 DIAGNOSIS — G40.909 SEIZURE DISORDER: ICD-10-CM

## 2017-12-22 DIAGNOSIS — I10 HYPERTENSION GOAL BP (BLOOD PRESSURE) < 140/90: ICD-10-CM

## 2017-12-22 RX ORDER — AMLODIPINE BESYLATE 5 MG/1
5 TABLET ORAL DAILY
Qty: 30 TABLET | Refills: 3 | Status: SHIPPED | OUTPATIENT
Start: 2017-12-22 | End: 2018-05-23 | Stop reason: SDUPTHER

## 2017-12-22 RX ORDER — METOPROLOL TARTRATE 25 MG/1
25 TABLET, FILM COATED ORAL 2 TIMES DAILY
Qty: 60 TABLET | Refills: 3 | Status: SHIPPED | OUTPATIENT
Start: 2017-12-22 | End: 2018-05-23 | Stop reason: SDUPTHER

## 2017-12-22 RX ORDER — LEVETIRACETAM 1000 MG/1
1000 TABLET ORAL 2 TIMES DAILY
Qty: 60 TABLET | Refills: 3 | Status: SHIPPED | OUTPATIENT
Start: 2017-12-22 | End: 2018-05-23 | Stop reason: SDUPTHER

## 2018-01-08 ENCOUNTER — PATIENT OUTREACH (OUTPATIENT)
Dept: ADMINISTRATIVE | Facility: HOSPITAL | Age: 53
End: 2018-01-08

## 2018-01-08 NOTE — PROGRESS NOTES
Patient also due for influenza vaccination, colonoscopy, pap smear, Hemoglobin A1C, and lipid panel. Patient already schedule for appointments with lab and visit with Dr. Lay. Patient's caregiver will discuss this at appointment. Caregiver vocalized understanding and in agreement.

## 2018-01-08 NOTE — PROGRESS NOTES
Schedule patient for annual mammogram on 01/29/2018 at 12:45 pm. Patient's caregiver in agreement and vocalize understanding. Appointment reminder sent in the mail.

## 2018-01-29 ENCOUNTER — TELEPHONE (OUTPATIENT)
Dept: INTERNAL MEDICINE | Facility: CLINIC | Age: 53
End: 2018-01-29

## 2018-01-29 NOTE — TELEPHONE ENCOUNTER
Daughter is concerned about moms eating,    States that her mom does eat but it is very little    Wants to know if you could call something in to increase her appetite or does she have to come in for visit?     Please advise

## 2018-01-29 NOTE — TELEPHONE ENCOUNTER
----- Message from Chandrika Lay sent at 1/29/2018  8:30 AM CST -----  Contact: PT daughter/ micheal   Caller request call back to see if pt can get some medication for her eating. .[phone] (home)

## 2018-03-19 ENCOUNTER — PATIENT OUTREACH (OUTPATIENT)
Dept: ADMINISTRATIVE | Facility: HOSPITAL | Age: 53
End: 2018-03-19

## 2018-04-23 ENCOUNTER — PATIENT OUTREACH (OUTPATIENT)
Dept: ADMINISTRATIVE | Facility: HOSPITAL | Age: 53
End: 2018-04-23

## 2018-04-23 NOTE — PROGRESS NOTES
I have attempted without success to contact this patient by phone to schedule annual mammogram exam and other health maintenance. Patient not available, left voicemail.

## 2018-04-27 ENCOUNTER — TELEPHONE (OUTPATIENT)
Dept: INTERNAL MEDICINE | Facility: CLINIC | Age: 53
End: 2018-04-27

## 2018-04-27 NOTE — TELEPHONE ENCOUNTER
----- Message from Marisel Queen sent at 4/27/2018  8:42 AM CDT -----  Contact: [facility]  pt needs to be [facility] next wk for hosp f/u (behavioral health,noncompliance, mental issues)..[phone] x [phone]

## 2018-05-09 ENCOUNTER — PATIENT OUTREACH (OUTPATIENT)
Dept: ADMINISTRATIVE | Facility: HOSPITAL | Age: 53
End: 2018-05-09

## 2018-05-23 ENCOUNTER — LAB VISIT (OUTPATIENT)
Dept: LAB | Facility: HOSPITAL | Age: 53
End: 2018-05-23
Attending: INTERNAL MEDICINE
Payer: MEDICAID

## 2018-05-23 ENCOUNTER — OFFICE VISIT (OUTPATIENT)
Dept: INTERNAL MEDICINE | Facility: CLINIC | Age: 53
End: 2018-05-23
Payer: MEDICAID

## 2018-05-23 VITALS
TEMPERATURE: 97 F | SYSTOLIC BLOOD PRESSURE: 124 MMHG | BODY MASS INDEX: 17.52 KG/M2 | HEART RATE: 72 BPM | HEIGHT: 65 IN | WEIGHT: 105.19 LBS | DIASTOLIC BLOOD PRESSURE: 84 MMHG

## 2018-05-23 DIAGNOSIS — R41.9 DECREASED ALERTNESS: ICD-10-CM

## 2018-05-23 DIAGNOSIS — G40.909 SEIZURE DISORDER: ICD-10-CM

## 2018-05-23 DIAGNOSIS — Z12.31 ENCOUNTER FOR SCREENING MAMMOGRAM FOR BREAST CANCER: ICD-10-CM

## 2018-05-23 DIAGNOSIS — R41.82 ALTERED MENTAL STATUS, UNSPECIFIED ALTERED MENTAL STATUS TYPE: ICD-10-CM

## 2018-05-23 DIAGNOSIS — R63.4 DECREASED WEIGHT: ICD-10-CM

## 2018-05-23 DIAGNOSIS — E11.9 TYPE 2 DIABETES MELLITUS WITHOUT COMPLICATION, WITHOUT LONG-TERM CURRENT USE OF INSULIN: ICD-10-CM

## 2018-05-23 DIAGNOSIS — Z12.11 SCREENING FOR COLON CANCER: ICD-10-CM

## 2018-05-23 DIAGNOSIS — I10 HYPERTENSION GOAL BP (BLOOD PRESSURE) < 140/90: ICD-10-CM

## 2018-05-23 DIAGNOSIS — I10 HYPERTENSION GOAL BP (BLOOD PRESSURE) < 140/90: Primary | ICD-10-CM

## 2018-05-23 LAB
ALBUMIN SERPL BCP-MCNC: 3.7 G/DL
ALP SERPL-CCNC: 112 U/L
ALT SERPL W/O P-5'-P-CCNC: 7 U/L
ANION GAP SERPL CALC-SCNC: 7 MMOL/L
AST SERPL-CCNC: 14 U/L
BASOPHILS # BLD AUTO: 0.03 K/UL
BASOPHILS NFR BLD: 0.7 %
BILIRUB SERPL-MCNC: 0.4 MG/DL
BUN SERPL-MCNC: 13 MG/DL
CALCIUM SERPL-MCNC: 10.3 MG/DL
CHLORIDE SERPL-SCNC: 106 MMOL/L
CO2 SERPL-SCNC: 31 MMOL/L
CREAT SERPL-MCNC: 0.8 MG/DL
DIFFERENTIAL METHOD: ABNORMAL
EOSINOPHIL # BLD AUTO: 0.1 K/UL
EOSINOPHIL NFR BLD: 1.3 %
ERYTHROCYTE [DISTWIDTH] IN BLOOD BY AUTOMATED COUNT: 13.3 %
EST. GFR  (AFRICAN AMERICAN): >60 ML/MIN/1.73 M^2
EST. GFR  (NON AFRICAN AMERICAN): >60 ML/MIN/1.73 M^2
GLUCOSE SERPL-MCNC: 80 MG/DL
HCT VFR BLD AUTO: 39.5 %
HGB BLD-MCNC: 12.3 G/DL
IMM GRANULOCYTES # BLD AUTO: 0.01 K/UL
IMM GRANULOCYTES NFR BLD AUTO: 0.2 %
LYMPHOCYTES # BLD AUTO: 1.2 K/UL
LYMPHOCYTES NFR BLD: 27.1 %
MCH RBC QN AUTO: 30.5 PG
MCHC RBC AUTO-ENTMCNC: 31.1 G/DL
MCV RBC AUTO: 98 FL
MONOCYTES # BLD AUTO: 0.5 K/UL
MONOCYTES NFR BLD: 10.7 %
NEUTROPHILS # BLD AUTO: 2.7 K/UL
NEUTROPHILS NFR BLD: 60 %
NRBC BLD-RTO: 0 /100 WBC
PLATELET # BLD AUTO: 256 K/UL
PMV BLD AUTO: 11.2 FL
POTASSIUM SERPL-SCNC: 3.9 MMOL/L
PROT SERPL-MCNC: 7.8 G/DL
RBC # BLD AUTO: 4.03 M/UL
SODIUM SERPL-SCNC: 144 MMOL/L
TSH SERPL DL<=0.005 MIU/L-ACNC: 1.11 UIU/ML
WBC # BLD AUTO: 4.5 K/UL

## 2018-05-23 PROCEDURE — 36415 COLL VENOUS BLD VENIPUNCTURE: CPT

## 2018-05-23 PROCEDURE — 99214 OFFICE O/P EST MOD 30 MIN: CPT | Mod: S$PBB,,, | Performed by: PHYSICIAN ASSISTANT

## 2018-05-23 PROCEDURE — 80053 COMPREHEN METABOLIC PANEL: CPT

## 2018-05-23 PROCEDURE — 84443 ASSAY THYROID STIM HORMONE: CPT

## 2018-05-23 PROCEDURE — 85025 COMPLETE CBC W/AUTO DIFF WBC: CPT

## 2018-05-23 PROCEDURE — 99215 OFFICE O/P EST HI 40 MIN: CPT | Mod: PBBFAC | Performed by: PHYSICIAN ASSISTANT

## 2018-05-23 PROCEDURE — 99999 PR PBB SHADOW E&M-EST. PATIENT-LVL V: CPT | Mod: PBBFAC,,, | Performed by: PHYSICIAN ASSISTANT

## 2018-05-23 RX ORDER — ATORVASTATIN CALCIUM 40 MG/1
40 TABLET, FILM COATED ORAL DAILY
Qty: 30 TABLET | Refills: 5 | Status: SHIPPED | OUTPATIENT
Start: 2018-05-23 | End: 2018-12-04 | Stop reason: SDUPTHER

## 2018-05-23 RX ORDER — CYPROHEPTADINE HYDROCHLORIDE 4 MG/1
4 TABLET ORAL 3 TIMES DAILY PRN
Qty: 90 TABLET | Refills: 5 | Status: SHIPPED | OUTPATIENT
Start: 2018-05-23 | End: 2020-01-13

## 2018-05-23 RX ORDER — HALOPERIDOL 5 MG/1
TABLET ORAL
Refills: 2 | COMMUNITY
Start: 2018-05-15 | End: 2021-02-19

## 2018-05-23 RX ORDER — AMLODIPINE BESYLATE 5 MG/1
5 TABLET ORAL DAILY
Qty: 30 TABLET | Refills: 3 | Status: SHIPPED | OUTPATIENT
Start: 2018-05-23 | End: 2020-01-13 | Stop reason: SDUPTHER

## 2018-05-23 RX ORDER — LEVETIRACETAM 1000 MG/1
1000 TABLET ORAL 2 TIMES DAILY
Qty: 60 TABLET | Refills: 3 | Status: SHIPPED | OUTPATIENT
Start: 2018-05-23 | End: 2018-07-30 | Stop reason: SDUPTHER

## 2018-05-23 RX ORDER — METOPROLOL TARTRATE 25 MG/1
25 TABLET, FILM COATED ORAL 2 TIMES DAILY
Qty: 60 TABLET | Refills: 3 | Status: SHIPPED | OUTPATIENT
Start: 2018-05-23 | End: 2020-01-13

## 2018-05-23 NOTE — PATIENT INSTRUCTIONS
Wellness Goals    Health Maintenance   Topic Date Due    Eye Exam  11/24/1973    Pap Smear with HPV Cotest  11/24/1984    Mammogram  11/24/2003    Lipid Panel  08/05/2014    Colonoscopy  02/08/2016    Hemoglobin A1c  11/30/2017    Influenza Vaccine  08/01/2018    Foot Exam  09/25/2018    TETANUS VACCINE  09/25/2027    Hepatitis C Screening  Completed     Health Maintenance Due   Topic Date Due    Eye Exam  11/24/1973    Pap Smear with HPV Cotest  11/24/1984    Mammogram  11/24/2003    Lipid Panel  08/05/2014    Colonoscopy  02/08/2016    Hemoglobin A1c  11/30/2017       Goal of Exercise is 150 min a week. walking    Encouraged to follow balanced diet, with daily servings of fresh fruits and vegetables.     Make sure to schedule all health maintenance appointments to achieve health care goals.  Annual check up is due every 12 months with your designated Provider/Care Team.

## 2018-05-23 NOTE — PROGRESS NOTES
Subjective:       Patient ID: Sera Whelan is a 52 y.o. female.    Chief Complaint: Hospital Follow Up    Patient is a mentally challenged 53 yo female who was recently admitted to an inpatient psych for Bipolar disorder. She has been out a week or so and has been doing better. The Psychiatrists request a CT for mental status change.Also her care giver is concerned about her weight loss and desires her to have an appetite stimulant      Hypertension   This is a chronic problem. The current episode started more than 1 year ago. The problem is unchanged. The problem is controlled. Associated symptoms include malaise/fatigue. Pertinent negatives include no chest pain or shortness of breath. There are no associated agents to hypertension. Risk factors for coronary artery disease include diabetes mellitus and sedentary lifestyle. There are no compliance problems.  Hypertensive end-organ damage includes CVA.   Diabetes   She presents for her follow-up diabetic visit. She has type 2 diabetes mellitus. Hypoglycemia symptoms include confusion. Pertinent negatives for hypoglycemia include no seizures. Associated symptoms include fatigue and weakness. Pertinent negatives for diabetes include no chest pain. There are no hypoglycemic complications. Diabetic complications include a CVA. Risk factors for coronary artery disease include diabetes mellitus, hypertension and sedentary lifestyle. When asked about current treatments, none were reported. She is following a generally healthy diet. When asked about meal planning, she reported none. She has not had a previous visit with a dietitian. She rarely participates in exercise. Home blood sugar record trend: does not check blood sugar. An ACE inhibitor/angiotensin II receptor blocker is not being taken. She does not see a podiatrist.Eye exam is current.     Past Medical History:   Diagnosis Date    Bipolar 1 disorder     Diabetes mellitus     Encounter for blood transfusion      Hypertension     Seizures     Stroke      Review of Systems   Constitutional: Positive for fatigue and malaise/fatigue. Negative for chills and fever.   HENT: Negative.    Respiratory: Negative for chest tightness and shortness of breath.    Cardiovascular: Negative for chest pain.   Gastrointestinal: Negative for abdominal pain.   Neurological: Positive for weakness. Negative for seizures.   Psychiatric/Behavioral: Positive for confusion.       Objective:      Physical Exam   Constitutional: She appears well-developed and well-nourished. No distress.   HENT:   Head: Normocephalic and atraumatic.   Neck: Neck supple.   Cardiovascular: Normal rate, regular rhythm and normal heart sounds.  Exam reveals no gallop and no friction rub.    No murmur heard.  Pulmonary/Chest: Effort normal and breath sounds normal. No respiratory distress. She has no wheezes. She has no rales. She exhibits no tenderness.   Abdominal: Soft. Bowel sounds are normal. She exhibits no distension and no mass. There is no tenderness. There is no rebound and no guarding. No hernia.   Lymphadenopathy:     She has no cervical adenopathy.   Skin: She is not diaphoretic.   Psychiatric: Her affect is blunt. She is slowed. She is noncommunicative.   Nursing note and vitals reviewed.      Assessment:       1. Hypertension goal BP (blood pressure) < 140/90    2. Decreased weight    3. Altered mental status, unspecified altered mental status type    4. Type 2 diabetes mellitus without complication, without long-term current use of insulin    5. Decreased alertness    6. Encounter for screening mammogram for breast cancer    7. Screening for colon cancer        Plan:       Hypertension goal BP (blood pressure) < 140/90  -     TSH; Future; Expected date: 05/23/2018  -     Comprehensive metabolic panel; Future; Expected date: 05/23/2018    Decreased weight  -     cyproheptadine (PERIACTIN) 4 mg tablet; Take 1 tablet (4 mg total) by mouth 3 (three) times  daily as needed.  Dispense: 90 tablet; Refill: 5  -     TSH; Future; Expected date: 05/23/2018  -     Comprehensive metabolic panel; Future; Expected date: 05/23/2018    Altered mental status, unspecified altered mental status type  -     CT Head W Wo Contrast; Future; Expected date: 05/23/2018  -     CBC auto differential; Future; Expected date: 05/23/2018  -     TSH; Future; Expected date: 05/23/2018  -     Comprehensive metabolic panel; Future; Expected date: 05/23/2018    Type 2 diabetes mellitus without complication, without long-term current use of insulin  -     Lipid panel; Future; Expected date: 05/23/2018  -     Cancel: Ambulatory Referral to Optometry  -     Hemoglobin A1c; Future; Expected date: 05/23/2018  -     Microalbumin/creatinine urine ratio; Future; Expected date: 05/23/2018  -     atorvastatin (LIPITOR) 40 MG tablet; Take 1 tablet (40 mg total) by mouth once daily.  Dispense: 30 tablet; Refill: 5  -     Comprehensive metabolic panel; Future; Expected date: 05/23/2018    Decreased alertness  -     atorvastatin (LIPITOR) 40 MG tablet; Take 1 tablet (40 mg total) by mouth once daily.  Dispense: 30 tablet; Refill: 5  -     CT Head W Wo Contrast; Future; Expected date: 05/23/2018  -     CBC auto differential; Future; Expected date: 05/23/2018  -     Comprehensive metabolic panel; Future; Expected date: 05/23/2018    Encounter for screening mammogram for breast cancer  -     Mammo Digital Screening Bilateral With CAD; Future; Expected date: 05/23/2018    Screening for colon cancer  -     Case request GI: COLONOSCOPY

## 2018-05-24 ENCOUNTER — TELEPHONE (OUTPATIENT)
Dept: INTERNAL MEDICINE | Facility: CLINIC | Age: 53
End: 2018-05-24

## 2018-05-26 ENCOUNTER — HOSPITAL ENCOUNTER (OUTPATIENT)
Dept: RADIOLOGY | Facility: HOSPITAL | Age: 53
Discharge: HOME | End: 2018-05-26
Attending: PHYSICIAN ASSISTANT
Payer: MEDICAID

## 2018-05-26 DIAGNOSIS — R41.9 DECREASED ALERTNESS: ICD-10-CM

## 2018-05-26 DIAGNOSIS — R41.82 ALTERED MENTAL STATUS, UNSPECIFIED ALTERED MENTAL STATUS TYPE: ICD-10-CM

## 2018-05-26 PROCEDURE — 70450 CT HEAD/BRAIN W/O DYE: CPT | Mod: TC

## 2018-05-28 ENCOUNTER — TELEPHONE (OUTPATIENT)
Dept: INTERNAL MEDICINE | Facility: CLINIC | Age: 53
End: 2018-05-28

## 2018-05-28 ENCOUNTER — DOCUMENTATION ONLY (OUTPATIENT)
Dept: ENDOSCOPY | Facility: HOSPITAL | Age: 53
End: 2018-05-28

## 2018-05-28 NOTE — TELEPHONE ENCOUNTER
----- Message from Guille Fuentes III, PA-C sent at 5/28/2018  7:44 AM CDT -----  No new changes seen on the CT of the head. I suggest to continue with the current plan.

## 2018-05-28 NOTE — TELEPHONE ENCOUNTER
----- Message from Guille Fuentes III, PA-C sent at 5/28/2018  7:43 AM CDT -----  The over all blood work looks ok. Patient may just follow up with Dr Lay in a year verses 6 months.

## 2018-05-29 ENCOUNTER — TELEPHONE (OUTPATIENT)
Dept: INTERNAL MEDICINE | Facility: CLINIC | Age: 53
End: 2018-05-29

## 2018-05-29 ENCOUNTER — HOSPITAL ENCOUNTER (EMERGENCY)
Facility: HOSPITAL | Age: 53
Discharge: HOME | End: 2018-05-29
Attending: EMERGENCY MEDICINE
Payer: MEDICAID

## 2018-05-29 VITALS
SYSTOLIC BLOOD PRESSURE: 151 MMHG | RESPIRATION RATE: 33 BRPM | HEART RATE: 85 BPM | HEIGHT: 65 IN | DIASTOLIC BLOOD PRESSURE: 86 MMHG | WEIGHT: 106 LBS | BODY MASS INDEX: 17.66 KG/M2 | TEMPERATURE: 99 F | OXYGEN SATURATION: 98 %

## 2018-05-29 DIAGNOSIS — G40.909 SEIZURE DISORDER: Primary | ICD-10-CM

## 2018-05-29 LAB
ALBUMIN SERPL BCP-MCNC: 4 G/DL
ALP SERPL-CCNC: 120 U/L
ALT SERPL W/O P-5'-P-CCNC: 12 U/L
ANION GAP SERPL CALC-SCNC: 12 MMOL/L
AST SERPL-CCNC: 18 U/L
BASOPHILS # BLD AUTO: 0.01 K/UL
BASOPHILS NFR BLD: 0.2 %
BILIRUB SERPL-MCNC: 0.5 MG/DL
BUN SERPL-MCNC: 19 MG/DL
CALCIUM SERPL-MCNC: 10.4 MG/DL
CHLORIDE SERPL-SCNC: 103 MMOL/L
CO2 SERPL-SCNC: 27 MMOL/L
CREAT SERPL-MCNC: 0.9 MG/DL
DIFFERENTIAL METHOD: ABNORMAL
EOSINOPHIL # BLD AUTO: 0.1 K/UL
EOSINOPHIL NFR BLD: 1.1 %
ERYTHROCYTE [DISTWIDTH] IN BLOOD BY AUTOMATED COUNT: 13.1 %
EST. GFR  (AFRICAN AMERICAN): >60 ML/MIN/1.73 M^2
EST. GFR  (NON AFRICAN AMERICAN): >60 ML/MIN/1.73 M^2
GLUCOSE SERPL-MCNC: 142 MG/DL
HCT VFR BLD AUTO: 40.3 %
HGB BLD-MCNC: 13.9 G/DL
LYMPHOCYTES # BLD AUTO: 1.2 K/UL
LYMPHOCYTES NFR BLD: 25.7 %
MCH RBC QN AUTO: 31.8 PG
MCHC RBC AUTO-ENTMCNC: 34.5 G/DL
MCV RBC AUTO: 92 FL
MONOCYTES # BLD AUTO: 0.3 K/UL
MONOCYTES NFR BLD: 6.9 %
NEUTROPHILS # BLD AUTO: 3.1 K/UL
NEUTROPHILS NFR BLD: 66.1 %
PLATELET # BLD AUTO: 270 K/UL
PMV BLD AUTO: 10 FL
POTASSIUM SERPL-SCNC: 4.1 MMOL/L
PROT SERPL-MCNC: 8.4 G/DL
RBC # BLD AUTO: 4.37 M/UL
SODIUM SERPL-SCNC: 142 MMOL/L
WBC # BLD AUTO: 4.67 K/UL

## 2018-05-29 PROCEDURE — 96365 THER/PROPH/DIAG IV INF INIT: CPT

## 2018-05-29 PROCEDURE — 80053 COMPREHEN METABOLIC PANEL: CPT

## 2018-05-29 PROCEDURE — 99284 EMERGENCY DEPT VISIT MOD MDM: CPT | Mod: 25

## 2018-05-29 PROCEDURE — 63600175 PHARM REV CODE 636 W HCPCS: Performed by: EMERGENCY MEDICINE

## 2018-05-29 PROCEDURE — 85025 COMPLETE CBC W/AUTO DIFF WBC: CPT

## 2018-05-29 RX ORDER — LEVETIRACETAM 10 MG/ML
1000 INJECTION INTRAVASCULAR
Status: COMPLETED | OUTPATIENT
Start: 2018-05-29 | End: 2018-05-29

## 2018-05-29 RX ADMIN — LEVETIRACETAM 1000 MG: 10 INJECTION INTRAVENOUS at 12:05

## 2018-05-29 NOTE — TELEPHONE ENCOUNTER
Daughter says they were not able to do the urine with the lab at the hospital when they did the CT. We will do next time she is in the clinic.

## 2018-05-29 NOTE — ED NOTES
Patient placed on continuous cardiac monitor, automatic blood pressure cuff and continuous pulse oximeter. Suction set up at BS, bedrails padded, bed placed in lowest position, call light within reach.

## 2018-05-29 NOTE — ED PROVIDER NOTES
SCRIBE #1 NOTE: I, Carissa England, am scribing for, and in the presence of, Jovani Guerin Jr., MD. I have scribed the entire note.      History      Chief Complaint   Patient presents with    Seizures     seizure today, hx of with last seizure 2 months ago       Review of patient's allergies indicates:  No Known Allergies     HPI   HPI    5/29/2018, 12:14 PM   History obtained from the EMS  HPI limited, pt post-ictal      History of Present Illness: Sera Whelan is a 52 y.o. female patient with PMHx of seizures, HTN, DM, Bipolar, who presents to the Emergency Department s/p seizure which onset suddenly today. Per EMS pt had a seizure just PTA. Last seizure was 2 months PTA. Pt takes Keppra.       Arrival mode: EMS    PCP: Rosario Lay DO       Past Medical History:  Past Medical History:   Diagnosis Date    Bipolar 1 disorder     Diabetes mellitus     Encounter for blood transfusion     Hypertension     Seizures     Stroke        Past Surgical History:  History reviewed. No pertinent surgical history.    Family History:  Family History   Problem Relation Age of Onset    Diabetes Mother     Stroke Mother        Social History:  Social History     Social History Main Topics    Smoking status: Never Smoker    Smokeless tobacco: Never Used    Alcohol use No    Drug use: No    Sexual activity: unknown       ROS   Review of Systems   Unable to perform ROS: Mental status change (Post-ictal)     Physical Exam      Initial Vitals [05/29/18 1210]   BP Pulse Resp Temp SpO2   (!) 160/90 98 18 99 °F (37.2 °C) 98 %      MAP       113.33          Physical Exam  Nursing Notes and Vital Signs Reviewed.  Constitutional: Patient is in no acute distress. Well-developed and well-nourished.  Head: Atraumatic. Normocephalic.  Eyes: PERRL. EOM intact. Conjunctivae are not pale. No scleral icterus.  ENT: Mucous membranes are moist. Oropharynx is clear and symmetric.    Neck: Supple. Full ROM. No  "lymphadenopathy.  Cardiovascular: Regular rate. Regular rhythm. No murmurs, rubs, or gallops. Distal pulses are 2+ and symmetric.  Pulmonary/Chest: No respiratory distress. Clear to auscultation bilaterally. No wheezing or rales.  Abdominal: Soft and non-distended.  There is no tenderness.  No rebound, guarding, or rigidity.   Musculoskeletal: Moves all extremities. No obvious deformities.   Skin: Warm and dry.  Neurological:  Appears to be post-ictal. Awake. Not following commands.  No acute focal neurological deficits are appreciated.    ED Course    Procedures  ED Vital Signs:  Vitals:    05/29/18 1210 05/29/18 1245 05/29/18 1258 05/29/18 1326   BP: (!) 160/90  (!) 159/93 (!) 159/89   Pulse: 98 83 80 81   Resp: 18  19 20   Temp: 99 °F (37.2 °C)      TempSrc: Oral      SpO2: 98%  97%    Weight: 48.1 kg (106 lb)      Height: 5' 5" (1.651 m)          Abnormal Lab Results:  Labs Reviewed   CBC W/ AUTO DIFFERENTIAL - Abnormal; Notable for the following:        Result Value    MCH 31.8 (*)     All other components within normal limits   COMPREHENSIVE METABOLIC PANEL - Abnormal; Notable for the following:     Glucose 142 (*)     All other components within normal limits        All Lab Results:  Results for orders placed or performed during the hospital encounter of 05/29/18   CBC auto differential   Result Value Ref Range    WBC 4.67 3.90 - 12.70 K/uL    RBC 4.37 4.00 - 5.40 M/uL    Hemoglobin 13.9 12.0 - 16.0 g/dL    Hematocrit 40.3 37.0 - 48.5 %    MCV 92 82 - 98 fL    MCH 31.8 (H) 27.0 - 31.0 pg    MCHC 34.5 32.0 - 36.0 g/dL    RDW 13.1 11.5 - 14.5 %    Platelets 270 150 - 350 K/uL    MPV 10.0 9.2 - 12.9 fL    Gran # (ANC) 3.1 1.8 - 7.7 K/uL    Lymph # 1.2 1.0 - 4.8 K/uL    Mono # 0.3 0.3 - 1.0 K/uL    Eos # 0.1 0.0 - 0.5 K/uL    Baso # 0.01 0.00 - 0.20 K/uL    Gran% 66.1 38.0 - 73.0 %    Lymph% 25.7 18.0 - 48.0 %    Mono% 6.9 4.0 - 15.0 %    Eosinophil% 1.1 0.0 - 8.0 %    Basophil% 0.2 0.0 - 1.9 %    Differential " Method Automated    Comprehensive metabolic panel   Result Value Ref Range    Sodium 142 136 - 145 mmol/L    Potassium 4.1 3.5 - 5.1 mmol/L    Chloride 103 95 - 110 mmol/L    CO2 27 23 - 29 mmol/L    Glucose 142 (H) 70 - 110 mg/dL    BUN, Bld 19 6 - 20 mg/dL    Creatinine 0.9 0.5 - 1.4 mg/dL    Calcium 10.4 8.7 - 10.5 mg/dL    Total Protein 8.4 6.0 - 8.4 g/dL    Albumin 4.0 3.5 - 5.2 g/dL    Total Bilirubin 0.5 0.1 - 1.0 mg/dL    Alkaline Phosphatase 120 55 - 135 U/L    AST 18 10 - 40 U/L    ALT 12 10 - 44 U/L    Anion Gap 12 8 - 16 mmol/L    eGFR if African American >60 >60 mL/min/1.73 m^2    eGFR if non African American >60 >60 mL/min/1.73 m^2              The Emergency Provider reviewed the vital signs and test results, which are outlined above.    ED Discussion     2:17 PM: Reassessed pt at this time. Pt is awake and alert, in NAD, and VSS. No seizure activity in ED. Pt has someone to pick her up. Discussed with pt all pertinent ED information and results. Discussed pt dx and plan of tx. Gave pt all f/u and return to the ED instructions. All questions and concerns were addressed at this time. Pt expresses understanding of information and instructions, and is comfortable with plan to discharge. Pt is stable for discharge.    Patient presents with seizure or seizure-like symptoms. I have no suspicion of an intracranial, traumatic, infectious, metabolic, toxic, cardiovascular (specifically arrhythmia), or other emergent medical condition requiring further intervention. Seizure precautions were discussed with the patient and/or caretaker, specifically not to swim unattended, not to operate motor vehicles or other machinery, and to avoid heights or other areas where falls may occur until cleared by primary care physician. Patient is safe for discharge.      ED Medication(s):  Medications   levETIRAcetam in NaCl (iso-os) IVPB 1,000 mg (0 mg Intravenous Stopped 5/29/18 6489)       New Prescriptions    No medications on  file       Follow-up Information     Rosario Lay DO. Call in 2 days.    Specialty:  Internal Medicine  Contact information:  [address] [place] [address] DR Antonia BRADLEY [address] [phone]                     Medical Decision Making    Medical Decision Making:   Clinical Tests:   Lab Tests: Ordered and Reviewed           Scribe Attestation:   Scribe #1: I performed the above scribed service and the documentation accurately describes the services I performed. I attest to the accuracy of the note.    Attending:   Physician Attestation Statement for Scribe #1: I, Jovani Guerin Jr., MD, personally performed the services described in this documentation, as scribed by Carissa England, in my presence, and it is both accurate and complete.          Clinical Impression       ICD-10-CM ICD-9-CM   1. Seizure disorder G40.909 345.90       Disposition:   Disposition: Discharged  Condition: Stable         Jovani Guerin Jr., MD  05/29/18 0520

## 2018-05-29 NOTE — ED NOTES
"Patient unable to give information to contact someone to pick her up. Was only able to give the name the name "Sharan" and repeats " my daughter can come". Number dialed that is on facesheet. No answer.   "

## 2018-05-29 NOTE — TELEPHONE ENCOUNTER
Patients daughter Sharan returned call and results and recommendations given with verbalized understanding.

## 2018-05-29 NOTE — ED NOTES
Brought to Ed by [facility] with reported seizure, upon placement to ED room; postictal with confusion; oriented to self. Disoriented to time, place, age.

## 2018-07-30 ENCOUNTER — TELEPHONE (OUTPATIENT)
Dept: INTERNAL MEDICINE | Facility: CLINIC | Age: 53
End: 2018-07-30

## 2018-07-30 DIAGNOSIS — G40.909 SEIZURE DISORDER: ICD-10-CM

## 2018-07-30 RX ORDER — LEVETIRACETAM 500 MG/1
1000 TABLET ORAL 2 TIMES DAILY
Qty: 240 TABLET | Refills: 3 | Status: SHIPPED | OUTPATIENT
Start: 2018-07-30 | End: 2020-01-13 | Stop reason: SDUPTHER

## 2018-07-30 NOTE — TELEPHONE ENCOUNTER
----- Message from Ashely Julien MA sent at 7/30/2018  3:39 PM CDT -----  [facility] sending for clarification. LevETIRAcetam 1000 mg is currently on back order. Please change prescription.    See orders

## 2018-11-28 ENCOUNTER — TELEPHONE (OUTPATIENT)
Dept: ADMINISTRATIVE | Facility: CLINIC | Age: 53
End: 2018-11-28

## 2018-11-28 NOTE — TELEPHONE ENCOUNTER
Home Health SOC 11/09/2018 - 01/07/2019 with [facility] (Antonia Almanzar) - Dr. Rosario Lay. MSW services.

## 2018-12-04 DIAGNOSIS — E11.9 TYPE 2 DIABETES MELLITUS WITHOUT COMPLICATION, WITHOUT LONG-TERM CURRENT USE OF INSULIN: ICD-10-CM

## 2018-12-04 DIAGNOSIS — R41.9 DECREASED ALERTNESS: ICD-10-CM

## 2018-12-04 RX ORDER — ATORVASTATIN CALCIUM 40 MG/1
TABLET, FILM COATED ORAL
Qty: 30 TABLET | Refills: 0 | Status: SHIPPED | OUTPATIENT
Start: 2018-12-04 | End: 2020-01-13 | Stop reason: SDUPTHER

## 2018-12-13 DIAGNOSIS — G40.909 SEIZURE DISORDER: ICD-10-CM

## 2018-12-13 RX ORDER — LEVETIRACETAM 1000 MG/1
TABLET ORAL
Qty: 60 TABLET | Refills: 3 | OUTPATIENT
Start: 2018-12-13

## 2019-08-15 ENCOUNTER — PATIENT OUTREACH (OUTPATIENT)
Dept: ADMINISTRATIVE | Facility: HOSPITAL | Age: 54
End: 2019-08-15

## 2019-09-17 ENCOUNTER — PATIENT OUTREACH (OUTPATIENT)
Dept: ADMINISTRATIVE | Facility: HOSPITAL | Age: 54
End: 2019-09-17

## 2019-11-11 DIAGNOSIS — R41.9 DECREASED ALERTNESS: ICD-10-CM

## 2019-11-11 DIAGNOSIS — I10 HYPERTENSION GOAL BP (BLOOD PRESSURE) < 140/90: ICD-10-CM

## 2019-11-11 DIAGNOSIS — E11.9 TYPE 2 DIABETES MELLITUS WITHOUT COMPLICATION, WITHOUT LONG-TERM CURRENT USE OF INSULIN: ICD-10-CM

## 2019-11-12 RX ORDER — AMLODIPINE BESYLATE 5 MG/1
5 TABLET ORAL DAILY
Qty: 30 TABLET | Refills: 3 | OUTPATIENT
Start: 2019-11-12

## 2019-11-12 RX ORDER — ATORVASTATIN CALCIUM 40 MG/1
40 TABLET, FILM COATED ORAL DAILY
Qty: 30 TABLET | Refills: 0 | OUTPATIENT
Start: 2019-11-12

## 2019-11-13 DIAGNOSIS — R41.9 DECREASED ALERTNESS: ICD-10-CM

## 2019-11-13 DIAGNOSIS — E11.9 TYPE 2 DIABETES MELLITUS WITHOUT COMPLICATION, WITHOUT LONG-TERM CURRENT USE OF INSULIN: ICD-10-CM

## 2019-11-13 DIAGNOSIS — I10 HYPERTENSION GOAL BP (BLOOD PRESSURE) < 140/90: ICD-10-CM

## 2019-11-15 RX ORDER — AMLODIPINE BESYLATE 5 MG/1
5 TABLET ORAL DAILY
Qty: 30 TABLET | Refills: 3 | OUTPATIENT
Start: 2019-11-15

## 2019-11-15 RX ORDER — ATORVASTATIN CALCIUM 40 MG/1
40 TABLET, FILM COATED ORAL DAILY
Qty: 30 TABLET | Refills: 0 | OUTPATIENT
Start: 2019-11-15

## 2019-11-26 ENCOUNTER — TELEPHONE (OUTPATIENT)
Dept: INTERNAL MEDICINE | Facility: CLINIC | Age: 54
End: 2019-11-26

## 2019-11-26 NOTE — TELEPHONE ENCOUNTER
----- Message from Chapincito Ladd sent at 11/26/2019  8:02 AM CST -----  Contact: Self- [phone]  Would like a call from nurse in regards to rescheduling an appt for this Friday Monday, if possible. Please call back to confirm at [phone].     Thank You,   Chapincito Ladd

## 2020-01-06 ENCOUNTER — TELEPHONE (OUTPATIENT)
Dept: INTERNAL MEDICINE | Facility: CLINIC | Age: 55
End: 2020-01-06

## 2020-01-06 NOTE — TELEPHONE ENCOUNTER
----- Message from Sarah Padron sent at 1/6/2020 10:22 AM CST -----  Contact: Anyle/daughter [phone]  Type:  Sooner Apoointment Request    Caller is requesting a sooner appointment.  Caller declined first available appointment listed below.  Caller will not accept being placed on the waitlist and is requesting a message be sent to doctor.  Name of Caller:Anyanshu/daughter  When is the first available appointment?04/22/20  Symptoms:annual  Would the patient rather a call back or a response via [facility]? Call back   Best Call Back Number:[phone]  Additional Information: States that she would like an appt for this Wednesday 01/08/20. Pt is an established medicaid pt.

## 2020-01-13 ENCOUNTER — OFFICE VISIT (OUTPATIENT)
Dept: INTERNAL MEDICINE | Facility: CLINIC | Age: 55
End: 2020-01-13
Payer: MEDICAID

## 2020-01-13 VITALS
BODY MASS INDEX: 17.19 KG/M2 | OXYGEN SATURATION: 95 % | HEIGHT: 65 IN | TEMPERATURE: 97 F | WEIGHT: 103.19 LBS | DIASTOLIC BLOOD PRESSURE: 90 MMHG | SYSTOLIC BLOOD PRESSURE: 150 MMHG | HEART RATE: 87 BPM

## 2020-01-13 DIAGNOSIS — I10 HYPERTENSION GOAL BP (BLOOD PRESSURE) < 130/80: Primary | ICD-10-CM

## 2020-01-13 DIAGNOSIS — Z12.39 BREAST CANCER SCREENING: ICD-10-CM

## 2020-01-13 DIAGNOSIS — E11.9 DIET-CONTROLLED DIABETES MELLITUS: ICD-10-CM

## 2020-01-13 DIAGNOSIS — E78.5 HYPERLIPIDEMIA LDL GOAL <70: ICD-10-CM

## 2020-01-13 DIAGNOSIS — G40.909 SEIZURE DISORDER: ICD-10-CM

## 2020-01-13 PROCEDURE — 99999 PR PBB SHADOW E&M-EST. PATIENT-LVL III: ICD-10-PCS | Mod: PBBFAC,,, | Performed by: INTERNAL MEDICINE

## 2020-01-13 PROCEDURE — 99214 OFFICE O/P EST MOD 30 MIN: CPT | Mod: S$PBB,,, | Performed by: INTERNAL MEDICINE

## 2020-01-13 PROCEDURE — 99999 PR PBB SHADOW E&M-EST. PATIENT-LVL III: CPT | Mod: PBBFAC,,, | Performed by: INTERNAL MEDICINE

## 2020-01-13 PROCEDURE — 99214 PR OFFICE/OUTPT VISIT, EST, LEVL IV, 30-39 MIN: ICD-10-PCS | Mod: S$PBB,,, | Performed by: INTERNAL MEDICINE

## 2020-01-13 PROCEDURE — 99213 OFFICE O/P EST LOW 20 MIN: CPT | Mod: PBBFAC | Performed by: INTERNAL MEDICINE

## 2020-01-13 RX ORDER — LEVETIRACETAM 500 MG/1
1000 TABLET ORAL 2 TIMES DAILY
Qty: 120 TABLET | Refills: 6 | Status: SHIPPED | OUTPATIENT
Start: 2020-01-13 | End: 2021-01-15 | Stop reason: SDUPTHER

## 2020-01-13 RX ORDER — ATORVASTATIN CALCIUM 40 MG/1
40 TABLET, FILM COATED ORAL DAILY
Qty: 30 TABLET | Refills: 6 | Status: SHIPPED | OUTPATIENT
Start: 2020-01-13 | End: 2020-08-24

## 2020-01-13 RX ORDER — AMLODIPINE BESYLATE 5 MG/1
5 TABLET ORAL DAILY
Qty: 30 TABLET | Refills: 1 | Status: SHIPPED | OUTPATIENT
Start: 2020-01-13 | End: 2021-01-21 | Stop reason: SDUPTHER

## 2020-01-13 NOTE — PROGRESS NOTES
Subjective:       Patient ID: Sera Whelan is a 54 y.o. female.    Chief Complaint: Follow-up    Sera Whelan  54 y.o. Black or  female    Patient presents with:  Follow-up    HPI: Here today to follow up on chronic conditions. She is here with her son, who provides the history.   HTN--uncontrolled. She has not been taking any blood pressure medication. She denies symptoms.   HLD--she is out of atorvastatin.                    CHOL                     193                 05/26/2018                               HDL                      43                  05/26/2018                             LDLCALC                  117.2               05/26/2018                              TRIG                     164 (H)             05/26/2018            Diabetes--diet controlled. She reports being on insulin in the past.                   HGBA1C                   5.5                 05/26/2018            Seizure disorder--stable on Keppra. She needs refills.     Past Medical History:  Bipolar 1 disorder  Diabetes mellitus  Encounter for blood transfusion  Hypertension  Seizures  Stroke    Current Outpatient Medications on File Prior to Visit:  atorvastatin (LIPITOR) 40 MG tablet, TAKE 1 TABLET BY MOUTH ONCE DAILY, Disp: 30 tablet, Rfl: 0  benztropine (COGENTIN) 1 MG tablet, Take 1 mg by mouth 2 (two) times daily., Disp: , Rfl:   haloperidol (HALDOL) 5 MG tablet, TAKE ONE-HALF TABLET BY MOUTH EVERY MORNING AND ONE TABLET BY MOUTH EVERY NIGHT AT BEDTIME, Disp: , Rfl: 2  perphenazine (TRILAFON) 4 MG tablet, Take 4 mg by mouth 2 (two) times daily., Disp: , Rfl:   risperidone (RISPERDAL) 3 MG Tab, Take 3 mg by mouth 2 (two) times daily. , Disp: , Rfl:   sertraline (ZOLOFT) 50 MG tablet, Take 50 mg by mouth once daily., Disp: , Rfl:   levETIRAcetam (KEPPRA) 500 MG Tab, Take 2 tablets (1,000 mg total) by mouth 2 (two) times daily. (Patient not taking: Reported on 1/13/2020), Disp: 240 tablet, Rfl:  3    Allergies:  Review of patient's allergies indicates:  No Known Allergies          Review of Systems   Constitutional: Negative for appetite change and unexpected weight change.   Respiratory: Negative for shortness of breath.    Cardiovascular: Negative for chest pain and leg swelling.   Musculoskeletal: Positive for gait problem.   Neurological: Negative for dizziness, seizures, numbness and headaches.   Psychiatric/Behavioral: Negative for behavioral problems and sleep disturbance.       Objective:      Physical Exam   Constitutional: She is oriented to person, place, and time. She appears well-developed and well-nourished. No distress.   Eyes: No scleral icterus.   Cardiovascular: Normal rate, regular rhythm and normal heart sounds.   Pulses:       Dorsalis pedis pulses are 2+ on the right side, and 2+ on the left side.   Pulmonary/Chest: Effort normal and breath sounds normal. No respiratory distress.   Abdominal: Soft. Bowel sounds are normal.   Musculoskeletal: She exhibits no edema.   Feet:   Right Foot:   Protective Sensation: 5 sites tested. 4 sites sensed.   Skin Integrity: Positive for callus. Negative for ulcer.   Left Foot:   Protective Sensation: 5 sites tested. 4 sites sensed.   Skin Integrity: Positive for callus. Negative for ulcer.   Neurological: She is alert and oriented to person, place, and time.   Skin: Skin is warm and dry.   Psychiatric: She has a normal mood and affect.   Vitals reviewed.      Assessment:       1. Hypertension goal BP (blood pressure) < 130/80    2. Hyperlipidemia LDL goal <70    3. Diet-controlled diabetes mellitus    4. Seizure disorder    5. Breast cancer screening        Plan:       Sera was seen today for follow-up.    Diagnoses and all orders for this visit:    Hypertension goal BP (blood pressure) < 130/80  -     Comprehensive metabolic panel; Standing  -     Lipid panel; Standing  -     amLODIPine (NORVASC) 5 MG tablet; Take 1 tablet (5 mg total) by mouth once  daily.    Hyperlipidemia LDL goal <70  -     Comprehensive metabolic panel; Standing  -     Lipid panel; Standing  -     atorvastatin (LIPITOR) 40 MG tablet; Take 1 tablet (40 mg total) by mouth once daily.    Diet-controlled diabetes mellitus  -     Hemoglobin A1c; Standing  -     Comprehensive metabolic panel; Standing  -     Lipid panel; Standing  -     Microalbumin/creatinine urine ratio; Future    Seizure disorder  -     levETIRAcetam (KEPPRA) 500 MG Tab; Take 2 tablets (1,000 mg total) by mouth 2 (two) times daily.    Breast cancer screening  -     Mammo Digital Screening Bilat; Future    Schedule labs.     RTC in 4 weeks for HTN.

## 2020-01-17 ENCOUNTER — TELEPHONE (OUTPATIENT)
Dept: INTERNAL MEDICINE | Facility: CLINIC | Age: 55
End: 2020-01-17

## 2020-01-17 ENCOUNTER — HOSPITAL ENCOUNTER (OUTPATIENT)
Dept: RADIOLOGY | Facility: HOSPITAL | Age: 55
Discharge: HOME | End: 2020-01-17
Attending: INTERNAL MEDICINE
Payer: MEDICAID

## 2020-01-17 DIAGNOSIS — Z12.39 BREAST CANCER SCREENING: ICD-10-CM

## 2020-01-17 PROCEDURE — 77067 SCR MAMMO BI INCL CAD: CPT | Mod: 26,,, | Performed by: RADIOLOGY

## 2020-01-17 PROCEDURE — 77067 SCR MAMMO BI INCL CAD: CPT | Mod: TC

## 2020-01-17 PROCEDURE — 77067 MAMMO DIGITAL SCREENING BILAT WITH CAD: ICD-10-PCS | Mod: 26,,, | Performed by: RADIOLOGY

## 2020-01-17 NOTE — TELEPHONE ENCOUNTER
----- Message from Laura Hines sent at 1/17/2020  3:42 PM CST -----  Contact: pt  Type:  Patient Returning Call    Who Called: the pt   Who Left Message for Patient: unknown  Does the patient know what this is regarding?: no  Would the patient rather a call back or a response via [facility]? Call back  Best Call Back Number: [phone]  Additional Information: n/a

## 2020-01-17 NOTE — TELEPHONE ENCOUNTER
----- Message from Rosario Lay DO sent at 1/17/2020  3:07 PM CST -----  Notify patient mammogram did not show any significant findings. 1 year follow up is recommended.

## 2020-02-05 ENCOUNTER — PATIENT OUTREACH (OUTPATIENT)
Dept: ADMINISTRATIVE | Facility: HOSPITAL | Age: 55
End: 2020-02-05

## 2020-10-05 ENCOUNTER — TELEPHONE (OUTPATIENT)
Dept: INTERNAL MEDICINE | Facility: CLINIC | Age: 55
End: 2020-10-05

## 2020-10-05 NOTE — TELEPHONE ENCOUNTER
----- Message from Milena Whitney sent at 10/5/2020 10:52 AM CDT -----  Regarding: CALLING TO GET HOSP F/UP FOR PATIENT TO BE SEEN IN 2-3 WEEKS  Contact: GIANCARLO ANGUIANO -[facility]  PATIENT CANNOT LEAVE HOSPITAL UNTIL SHE GET AN APPOINTMENT SCHEDULED. PLEASE CALL ANNMARIE @ ASAP TODAY [phone]. THANKS

## 2020-10-05 NOTE — TELEPHONE ENCOUNTER
----- Message from Milena Whitney sent at 10/5/2020 10:52 AM CDT -----  Regarding: CALLING TO GET HOSP F/UP FOR PATIENT TO BE SEEN IN 2-3 WEEKS  Contact: GIANCARLO ANGUIANO -[facility]  PATIENT CANNOT LEAVE HOSPITAL UNTIL SHE GET AN APPOINTMENT SCHEDULED. PLEASE CALL ANNMARIE @ ASAP TODAY [phone]. THANKS

## 2021-01-13 ENCOUNTER — TELEPHONE (OUTPATIENT)
Dept: INTERNAL MEDICINE | Facility: CLINIC | Age: 56
End: 2021-01-13

## 2021-01-14 ENCOUNTER — TELEPHONE (OUTPATIENT)
Dept: INTERNAL MEDICINE | Facility: CLINIC | Age: 56
End: 2021-01-14

## 2021-01-15 DIAGNOSIS — G40.909 SEIZURE DISORDER: ICD-10-CM

## 2021-01-15 RX ORDER — LEVETIRACETAM 500 MG/1
1000 TABLET ORAL 2 TIMES DAILY
Qty: 120 TABLET | Refills: 0 | Status: SHIPPED | OUTPATIENT
Start: 2021-01-15 | End: 2021-01-21 | Stop reason: SDUPTHER

## 2021-01-19 ENCOUNTER — TELEPHONE (OUTPATIENT)
Dept: INTERNAL MEDICINE | Facility: CLINIC | Age: 56
End: 2021-01-19

## 2021-01-21 ENCOUNTER — OFFICE VISIT (OUTPATIENT)
Dept: INTERNAL MEDICINE | Facility: CLINIC | Age: 56
End: 2021-01-21
Payer: MEDICAID

## 2021-01-21 ENCOUNTER — TELEPHONE (OUTPATIENT)
Dept: PSYCHIATRY | Facility: CLINIC | Age: 56
End: 2021-01-21

## 2021-01-21 VITALS
OXYGEN SATURATION: 95 % | TEMPERATURE: 99 F | WEIGHT: 120.38 LBS | HEART RATE: 97 BPM | BODY MASS INDEX: 20.06 KG/M2 | HEIGHT: 65 IN | DIASTOLIC BLOOD PRESSURE: 98 MMHG | SYSTOLIC BLOOD PRESSURE: 146 MMHG

## 2021-01-21 DIAGNOSIS — Z12.31 ENCOUNTER FOR SCREENING MAMMOGRAM FOR MALIGNANT NEOPLASM OF BREAST: ICD-10-CM

## 2021-01-21 DIAGNOSIS — G40.909 SEIZURE DISORDER: ICD-10-CM

## 2021-01-21 DIAGNOSIS — F31.60 BIPOLAR 1 DISORDER, MIXED: ICD-10-CM

## 2021-01-21 DIAGNOSIS — Z12.11 SCREEN FOR COLON CANCER: ICD-10-CM

## 2021-01-21 DIAGNOSIS — I10 HYPERTENSION GOAL BP (BLOOD PRESSURE) < 130/80: Primary | ICD-10-CM

## 2021-01-21 PROCEDURE — 99999 PR PBB SHADOW E&M-EST. PATIENT-LVL IV: CPT | Mod: PBBFAC,,, | Performed by: PHYSICIAN ASSISTANT

## 2021-01-21 PROCEDURE — 99214 PR OFFICE/OUTPT VISIT, EST, LEVL IV, 30-39 MIN: ICD-10-PCS | Mod: S$PBB,,, | Performed by: PHYSICIAN ASSISTANT

## 2021-01-21 PROCEDURE — 99214 OFFICE O/P EST MOD 30 MIN: CPT | Mod: PBBFAC | Performed by: PHYSICIAN ASSISTANT

## 2021-01-21 PROCEDURE — 99214 OFFICE O/P EST MOD 30 MIN: CPT | Mod: S$PBB,,, | Performed by: PHYSICIAN ASSISTANT

## 2021-01-21 PROCEDURE — 99999 PR PBB SHADOW E&M-EST. PATIENT-LVL IV: ICD-10-PCS | Mod: PBBFAC,,, | Performed by: PHYSICIAN ASSISTANT

## 2021-01-21 RX ORDER — AMLODIPINE BESYLATE 5 MG/1
5 TABLET ORAL DAILY
Qty: 30 TABLET | Refills: 1 | Status: SHIPPED | OUTPATIENT
Start: 2021-01-21 | End: 2021-02-19 | Stop reason: SDUPTHER

## 2021-01-21 RX ORDER — LEVETIRACETAM 500 MG/1
1000 TABLET ORAL 2 TIMES DAILY
Qty: 120 TABLET | Refills: 0 | Status: SHIPPED | OUTPATIENT
Start: 2021-01-21 | End: 2021-05-19 | Stop reason: SDUPTHER

## 2021-01-27 ENCOUNTER — TELEPHONE (OUTPATIENT)
Dept: ENDOSCOPY | Facility: HOSPITAL | Age: 56
End: 2021-01-27

## 2021-02-05 ENCOUNTER — HOSPITAL ENCOUNTER (OUTPATIENT)
Dept: RADIOLOGY | Facility: HOSPITAL | Age: 56
Discharge: HOME | End: 2021-02-05
Attending: PHYSICIAN ASSISTANT
Payer: MEDICAID

## 2021-02-05 ENCOUNTER — TELEPHONE (OUTPATIENT)
Dept: INTERNAL MEDICINE | Facility: CLINIC | Age: 56
End: 2021-02-05

## 2021-02-05 DIAGNOSIS — Z12.31 ENCOUNTER FOR SCREENING MAMMOGRAM FOR MALIGNANT NEOPLASM OF BREAST: ICD-10-CM

## 2021-02-05 PROCEDURE — 77067 SCR MAMMO BI INCL CAD: CPT | Mod: 26,,, | Performed by: RADIOLOGY

## 2021-02-05 PROCEDURE — 77067 SCR MAMMO BI INCL CAD: CPT | Mod: TC

## 2021-02-05 PROCEDURE — 77067 MAMMO DIGITAL SCREENING BILAT: ICD-10-PCS | Mod: 26,,, | Performed by: RADIOLOGY

## 2021-02-08 DIAGNOSIS — R92.8 ABNORMAL MAMMOGRAM OF LEFT BREAST: Primary | ICD-10-CM

## 2021-02-09 ENCOUNTER — TELEPHONE (OUTPATIENT)
Dept: INTERNAL MEDICINE | Facility: CLINIC | Age: 56
End: 2021-02-09

## 2021-02-12 ENCOUNTER — LAB VISIT (OUTPATIENT)
Dept: LAB | Facility: HOSPITAL | Age: 56
End: 2021-02-12
Attending: INTERNAL MEDICINE
Payer: MEDICAID

## 2021-02-12 DIAGNOSIS — I10 HYPERTENSION GOAL BP (BLOOD PRESSURE) < 130/80: ICD-10-CM

## 2021-02-12 DIAGNOSIS — E78.5 HYPERLIPIDEMIA LDL GOAL <70: ICD-10-CM

## 2021-02-12 DIAGNOSIS — E11.9 DIET-CONTROLLED DIABETES MELLITUS: ICD-10-CM

## 2021-02-12 LAB
ALBUMIN SERPL BCP-MCNC: 3.3 G/DL (ref 3.5–5.2)
ALP SERPL-CCNC: 130 U/L (ref 55–135)
ALT SERPL W/O P-5'-P-CCNC: 11 U/L (ref 10–44)
ANION GAP SERPL CALC-SCNC: 8 MMOL/L (ref 8–16)
AST SERPL-CCNC: 14 U/L (ref 10–40)
BILIRUB SERPL-MCNC: 0.3 MG/DL (ref 0.1–1)
BUN SERPL-MCNC: 11 MG/DL (ref 6–20)
CALCIUM SERPL-MCNC: 9.5 MG/DL (ref 8.7–10.5)
CHLORIDE SERPL-SCNC: 106 MMOL/L (ref 95–110)
CHOLEST SERPL-MCNC: 179 MG/DL (ref 120–199)
CHOLEST/HDLC SERPL: 4.7 {RATIO} (ref 2–5)
CO2 SERPL-SCNC: 29 MMOL/L (ref 23–29)
CREAT SERPL-MCNC: 0.9 MG/DL (ref 0.5–1.4)
EST. GFR  (AFRICAN AMERICAN): >60 ML/MIN/1.73 M^2
EST. GFR  (NON AFRICAN AMERICAN): >60 ML/MIN/1.73 M^2
ESTIMATED AVG GLUCOSE: 123 MG/DL (ref 68–131)
GLUCOSE SERPL-MCNC: 91 MG/DL (ref 70–110)
HBA1C MFR BLD: 5.9 % (ref 4–5.6)
HDLC SERPL-MCNC: 38 MG/DL (ref 40–75)
HDLC SERPL: 21.2 % (ref 20–50)
LDLC SERPL CALC-MCNC: 108.4 MG/DL (ref 63–159)
NONHDLC SERPL-MCNC: 141 MG/DL
POTASSIUM SERPL-SCNC: 3.6 MMOL/L (ref 3.5–5.1)
PROT SERPL-MCNC: 7.5 G/DL (ref 6–8.4)
SODIUM SERPL-SCNC: 143 MMOL/L (ref 136–145)
TRIGL SERPL-MCNC: 163 MG/DL (ref 30–150)

## 2021-02-12 PROCEDURE — 36415 COLL VENOUS BLD VENIPUNCTURE: CPT

## 2021-02-12 PROCEDURE — 80053 COMPREHEN METABOLIC PANEL: CPT

## 2021-02-12 PROCEDURE — 83036 HEMOGLOBIN GLYCOSYLATED A1C: CPT

## 2021-02-12 PROCEDURE — 80061 LIPID PANEL: CPT

## 2021-02-19 ENCOUNTER — OFFICE VISIT (OUTPATIENT)
Dept: INTERNAL MEDICINE | Facility: CLINIC | Age: 56
End: 2021-02-19
Payer: MEDICAID

## 2021-02-19 VITALS
TEMPERATURE: 99 F | OXYGEN SATURATION: 99 % | SYSTOLIC BLOOD PRESSURE: 146 MMHG | DIASTOLIC BLOOD PRESSURE: 106 MMHG | HEART RATE: 103 BPM | BODY MASS INDEX: 20.64 KG/M2 | WEIGHT: 123.88 LBS | HEIGHT: 65 IN

## 2021-02-19 DIAGNOSIS — Z12.4 CERVICAL CANCER SCREENING: ICD-10-CM

## 2021-02-19 DIAGNOSIS — Z23 IMMUNIZATION DUE: ICD-10-CM

## 2021-02-19 DIAGNOSIS — I10 ESSENTIAL HYPERTENSION: Primary | ICD-10-CM

## 2021-02-19 DIAGNOSIS — G40.909 SEIZURE DISORDER: ICD-10-CM

## 2021-02-19 DIAGNOSIS — R73.03 PREDIABETES: ICD-10-CM

## 2021-02-19 PROCEDURE — 99999 PR PBB SHADOW E&M-EST. PATIENT-LVL IV: CPT | Mod: PBBFAC,,, | Performed by: INTERNAL MEDICINE

## 2021-02-19 PROCEDURE — 99214 OFFICE O/P EST MOD 30 MIN: CPT | Mod: PBBFAC | Performed by: INTERNAL MEDICINE

## 2021-02-19 PROCEDURE — 99214 PR OFFICE/OUTPT VISIT, EST, LEVL IV, 30-39 MIN: ICD-10-PCS | Mod: S$PBB,,, | Performed by: INTERNAL MEDICINE

## 2021-02-19 PROCEDURE — 99214 OFFICE O/P EST MOD 30 MIN: CPT | Mod: S$PBB,,, | Performed by: INTERNAL MEDICINE

## 2021-02-19 PROCEDURE — 99999 PR PBB SHADOW E&M-EST. PATIENT-LVL IV: ICD-10-PCS | Mod: PBBFAC,,, | Performed by: INTERNAL MEDICINE

## 2021-02-19 PROCEDURE — 90686 IIV4 VACC NO PRSV 0.5 ML IM: CPT | Mod: PBBFAC

## 2021-02-19 RX ORDER — AMLODIPINE BESYLATE 10 MG/1
10 TABLET ORAL DAILY
Qty: 30 TABLET | Refills: 3 | Status: SHIPPED | OUTPATIENT
Start: 2021-02-19 | End: 2021-06-22 | Stop reason: SDUPTHER

## 2021-02-23 ENCOUNTER — TELEPHONE (OUTPATIENT)
Dept: RADIOLOGY | Facility: HOSPITAL | Age: 56
End: 2021-02-23

## 2021-02-23 ENCOUNTER — TELEPHONE (OUTPATIENT)
Dept: ENDOSCOPY | Facility: HOSPITAL | Age: 56
End: 2021-02-23

## 2021-02-26 ENCOUNTER — OFFICE VISIT (OUTPATIENT)
Dept: INTERNAL MEDICINE | Facility: CLINIC | Age: 56
End: 2021-02-26
Payer: MEDICAID

## 2021-02-26 VITALS
RESPIRATION RATE: 18 BRPM | BODY MASS INDEX: 20.2 KG/M2 | HEIGHT: 65 IN | WEIGHT: 121.25 LBS | HEART RATE: 85 BPM | SYSTOLIC BLOOD PRESSURE: 132 MMHG | OXYGEN SATURATION: 97 % | TEMPERATURE: 98 F | DIASTOLIC BLOOD PRESSURE: 90 MMHG

## 2021-02-26 DIAGNOSIS — I10 ESSENTIAL HYPERTENSION: Primary | ICD-10-CM

## 2021-02-26 PROCEDURE — 99999 PR PBB SHADOW E&M-EST. PATIENT-LVL III: CPT | Mod: PBBFAC,,, | Performed by: INTERNAL MEDICINE

## 2021-02-26 PROCEDURE — 99999 PR PBB SHADOW E&M-EST. PATIENT-LVL III: ICD-10-PCS | Mod: PBBFAC,,, | Performed by: INTERNAL MEDICINE

## 2021-02-26 PROCEDURE — 99213 OFFICE O/P EST LOW 20 MIN: CPT | Mod: PBBFAC | Performed by: INTERNAL MEDICINE

## 2021-02-26 PROCEDURE — 99213 PR OFFICE/OUTPT VISIT, EST, LEVL III, 20-29 MIN: ICD-10-PCS | Mod: S$PBB,,, | Performed by: INTERNAL MEDICINE

## 2021-02-26 PROCEDURE — 99213 OFFICE O/P EST LOW 20 MIN: CPT | Mod: S$PBB,,, | Performed by: INTERNAL MEDICINE

## 2021-03-02 ENCOUNTER — TELEPHONE (OUTPATIENT)
Dept: RADIOLOGY | Facility: HOSPITAL | Age: 56
End: 2021-03-02

## 2021-03-03 ENCOUNTER — HOSPITAL ENCOUNTER (OUTPATIENT)
Dept: RADIOLOGY | Facility: HOSPITAL | Age: 56
Discharge: HOME | End: 2021-03-03
Attending: PHYSICIAN ASSISTANT
Payer: MEDICAID

## 2021-03-03 DIAGNOSIS — R92.8 ABNORMAL MAMMOGRAM OF LEFT BREAST: ICD-10-CM

## 2021-03-03 PROCEDURE — 77061 BREAST TOMOSYNTHESIS UNI: CPT | Mod: 26,LT,, | Performed by: RADIOLOGY

## 2021-03-03 PROCEDURE — 77061 BREAST TOMOSYNTHESIS UNI: CPT | Mod: TC,LT

## 2021-03-03 PROCEDURE — 77065 DX MAMMO INCL CAD UNI: CPT | Mod: 26,LT,, | Performed by: RADIOLOGY

## 2021-03-03 PROCEDURE — 76642 ULTRASOUND BREAST LIMITED: CPT | Mod: 26,LT,, | Performed by: RADIOLOGY

## 2021-03-03 PROCEDURE — 76642 US BREAST LEFT LIMITED: ICD-10-PCS | Mod: 26,LT,, | Performed by: RADIOLOGY

## 2021-03-03 PROCEDURE — 77061 MAMMO DIGITAL DIAGNOSTIC LEFT WITH TOMO: ICD-10-PCS | Mod: 26,LT,, | Performed by: RADIOLOGY

## 2021-03-03 PROCEDURE — 76642 ULTRASOUND BREAST LIMITED: CPT | Mod: TC,LT

## 2021-03-03 PROCEDURE — 77065 MAMMO DIGITAL DIAGNOSTIC LEFT WITH TOMO: ICD-10-PCS | Mod: 26,LT,, | Performed by: RADIOLOGY

## 2021-03-09 ENCOUNTER — OFFICE VISIT (OUTPATIENT)
Dept: INTERNAL MEDICINE | Facility: CLINIC | Age: 56
End: 2021-03-09
Payer: MEDICAID

## 2021-03-09 VITALS
TEMPERATURE: 97 F | OXYGEN SATURATION: 96 % | BODY MASS INDEX: 20.57 KG/M2 | HEIGHT: 65 IN | SYSTOLIC BLOOD PRESSURE: 110 MMHG | HEART RATE: 83 BPM | DIASTOLIC BLOOD PRESSURE: 88 MMHG | WEIGHT: 123.44 LBS

## 2021-03-09 DIAGNOSIS — I10 ESSENTIAL HYPERTENSION: Primary | ICD-10-CM

## 2021-03-09 DIAGNOSIS — G40.909 SEIZURE DISORDER: ICD-10-CM

## 2021-03-09 DIAGNOSIS — F31.60 BIPOLAR 1 DISORDER, MIXED: ICD-10-CM

## 2021-03-09 PROCEDURE — 99999 PR PBB SHADOW E&M-EST. PATIENT-LVL III: CPT | Mod: PBBFAC,,, | Performed by: INTERNAL MEDICINE

## 2021-03-09 PROCEDURE — 99214 PR OFFICE/OUTPT VISIT, EST, LEVL IV, 30-39 MIN: ICD-10-PCS | Mod: S$PBB,,, | Performed by: INTERNAL MEDICINE

## 2021-03-09 PROCEDURE — 99213 OFFICE O/P EST LOW 20 MIN: CPT | Mod: PBBFAC | Performed by: INTERNAL MEDICINE

## 2021-03-09 PROCEDURE — 99214 OFFICE O/P EST MOD 30 MIN: CPT | Mod: S$PBB,,, | Performed by: INTERNAL MEDICINE

## 2021-03-09 PROCEDURE — 99999 PR PBB SHADOW E&M-EST. PATIENT-LVL III: ICD-10-PCS | Mod: PBBFAC,,, | Performed by: INTERNAL MEDICINE

## 2021-03-09 RX ORDER — BENZTROPINE MESYLATE 1 MG/1
1 TABLET ORAL
COMMUNITY
End: 2023-04-03 | Stop reason: SDUPTHER

## 2021-03-09 RX ORDER — HALOPERIDOL 5 MG/1
TABLET ORAL
COMMUNITY
Start: 2017-07-20 | End: 2021-04-28 | Stop reason: ALTCHOICE

## 2021-03-22 ENCOUNTER — LAB VISIT (OUTPATIENT)
Dept: OTOLARYNGOLOGY | Facility: CLINIC | Age: 56
End: 2021-03-22
Payer: MEDICAID

## 2021-03-22 DIAGNOSIS — Z01.818 PREOP TESTING: ICD-10-CM

## 2021-03-22 PROCEDURE — U0003 INFECTIOUS AGENT DETECTION BY NUCLEIC ACID (DNA OR RNA); SEVERE ACUTE RESPIRATORY SYNDROME CORONAVIRUS 2 (SARS-COV-2) (CORONAVIRUS DISEASE [COVID-19]), AMPLIFIED PROBE TECHNIQUE, MAKING USE OF HIGH THROUGHPUT TECHNOLOGIES AS DESCRIBED BY CMS-2020-01-R: HCPCS | Performed by: INTERNAL MEDICINE

## 2021-03-22 PROCEDURE — U0005 INFEC AGEN DETEC AMPLI PROBE: HCPCS | Performed by: INTERNAL MEDICINE

## 2021-03-23 LAB — SARS-COV-2 RNA RESP QL NAA+PROBE: NOT DETECTED

## 2021-03-25 DIAGNOSIS — Z12.11 COLON CANCER SCREENING: Primary | ICD-10-CM

## 2021-03-26 DIAGNOSIS — Z13.9 SCREENING PROCEDURE: Primary | ICD-10-CM

## 2021-03-27 ENCOUNTER — LAB VISIT (OUTPATIENT)
Dept: URGENT CARE | Facility: CLINIC | Age: 56
End: 2021-03-27
Payer: MEDICAID

## 2021-03-27 DIAGNOSIS — Z13.9 SCREENING PROCEDURE: ICD-10-CM

## 2021-03-27 PROCEDURE — U0005 INFEC AGEN DETEC AMPLI PROBE: HCPCS | Performed by: FAMILY MEDICINE

## 2021-03-27 PROCEDURE — U0003 INFECTIOUS AGENT DETECTION BY NUCLEIC ACID (DNA OR RNA); SEVERE ACUTE RESPIRATORY SYNDROME CORONAVIRUS 2 (SARS-COV-2) (CORONAVIRUS DISEASE [COVID-19]), AMPLIFIED PROBE TECHNIQUE, MAKING USE OF HIGH THROUGHPUT TECHNOLOGIES AS DESCRIBED BY CMS-2020-01-R: HCPCS | Performed by: FAMILY MEDICINE

## 2021-03-28 LAB — SARS-COV-2 RNA RESP QL NAA+PROBE: NOT DETECTED

## 2021-03-30 ENCOUNTER — HOSPITAL ENCOUNTER (OUTPATIENT)
Facility: HOSPITAL | Age: 56
Discharge: HOME | End: 2021-03-30
Attending: FAMILY MEDICINE | Admitting: FAMILY MEDICINE
Payer: MEDICAID

## 2021-03-30 ENCOUNTER — ANESTHESIA (OUTPATIENT)
Dept: ENDOSCOPY | Facility: HOSPITAL | Age: 56
End: 2021-03-30
Payer: MEDICAID

## 2021-03-30 ENCOUNTER — ANESTHESIA EVENT (OUTPATIENT)
Dept: ENDOSCOPY | Facility: HOSPITAL | Age: 56
End: 2021-03-30
Payer: MEDICAID

## 2021-03-30 DIAGNOSIS — Z12.11 COLON CANCER SCREENING: Primary | ICD-10-CM

## 2021-03-30 LAB — POCT GLUCOSE: 91 MG/DL (ref 70–110)

## 2021-03-30 PROCEDURE — 45378 PR COLONOSCOPY,DIAGNOSTIC: ICD-10-PCS | Mod: 52,,, | Performed by: FAMILY MEDICINE

## 2021-03-30 PROCEDURE — 63600175 PHARM REV CODE 636 W HCPCS: Performed by: NURSE ANESTHETIST, CERTIFIED REGISTERED

## 2021-03-30 PROCEDURE — 25000003 PHARM REV CODE 250: Performed by: NURSE ANESTHETIST, CERTIFIED REGISTERED

## 2021-03-30 PROCEDURE — 00812 ANES LWR INTST SCR COLSC: CPT | Performed by: FAMILY MEDICINE

## 2021-03-30 PROCEDURE — 45378 DIAGNOSTIC COLONOSCOPY: CPT | Mod: 52,,, | Performed by: FAMILY MEDICINE

## 2021-03-30 PROCEDURE — G0121 COLON CA SCRN NOT HI RSK IND: HCPCS | Performed by: FAMILY MEDICINE

## 2021-03-30 PROCEDURE — 37000008 HC ANESTHESIA 1ST 15 MINUTES: Performed by: FAMILY MEDICINE

## 2021-03-30 RX ORDER — PROPOFOL 10 MG/ML
VIAL (ML) INTRAVENOUS
Status: DISCONTINUED | OUTPATIENT
Start: 2021-03-30 | End: 2021-03-30

## 2021-03-30 RX ORDER — LIDOCAINE HYDROCHLORIDE 10 MG/ML
INJECTION, SOLUTION EPIDURAL; INFILTRATION; INTRACAUDAL; PERINEURAL
Status: DISCONTINUED | OUTPATIENT
Start: 2021-03-30 | End: 2021-03-30

## 2021-03-30 RX ORDER — SODIUM CHLORIDE, SODIUM LACTATE, POTASSIUM CHLORIDE, CALCIUM CHLORIDE 600; 310; 30; 20 MG/100ML; MG/100ML; MG/100ML; MG/100ML
INJECTION, SOLUTION INTRAVENOUS CONTINUOUS PRN
Status: DISCONTINUED | OUTPATIENT
Start: 2021-03-30 | End: 2021-03-30

## 2021-03-30 RX ADMIN — SODIUM CHLORIDE, SODIUM LACTATE, POTASSIUM CHLORIDE, AND CALCIUM CHLORIDE: .6; .31; .03; .02 INJECTION, SOLUTION INTRAVENOUS at 11:03

## 2021-03-30 RX ADMIN — PROPOFOL 50 MG: 10 INJECTION, EMULSION INTRAVENOUS at 11:03

## 2021-03-30 RX ADMIN — LIDOCAINE HYDROCHLORIDE 50 MG: 10 INJECTION, SOLUTION EPIDURAL; INFILTRATION; INTRACAUDAL; PERINEURAL at 11:03

## 2021-03-31 ENCOUNTER — TELEPHONE (OUTPATIENT)
Dept: INTERNAL MEDICINE | Facility: CLINIC | Age: 56
End: 2021-03-31

## 2021-03-31 VITALS
HEIGHT: 65 IN | TEMPERATURE: 98 F | DIASTOLIC BLOOD PRESSURE: 88 MMHG | BODY MASS INDEX: 18.33 KG/M2 | HEART RATE: 83 BPM | RESPIRATION RATE: 18 BRPM | SYSTOLIC BLOOD PRESSURE: 151 MMHG | WEIGHT: 110 LBS | OXYGEN SATURATION: 98 %

## 2021-03-31 DIAGNOSIS — Z12.11 COLON CANCER SCREENING: Primary | ICD-10-CM

## 2021-04-28 ENCOUNTER — OFFICE VISIT (OUTPATIENT)
Dept: PSYCHIATRY | Facility: CLINIC | Age: 56
End: 2021-04-28
Payer: MEDICAID

## 2021-04-28 ENCOUNTER — PATIENT MESSAGE (OUTPATIENT)
Dept: RESEARCH | Facility: HOSPITAL | Age: 56
End: 2021-04-28

## 2021-04-28 VITALS
WEIGHT: 120.56 LBS | SYSTOLIC BLOOD PRESSURE: 124 MMHG | BODY MASS INDEX: 20.07 KG/M2 | DIASTOLIC BLOOD PRESSURE: 83 MMHG | HEART RATE: 83 BPM

## 2021-04-28 DIAGNOSIS — F20.9 SCHIZOPHRENIA, UNSPECIFIED TYPE: Primary | ICD-10-CM

## 2021-04-28 DIAGNOSIS — F31.60 BIPOLAR 1 DISORDER, MIXED: ICD-10-CM

## 2021-04-28 PROCEDURE — 90792 PR PSYCHIATRIC DIAGNOSTIC EVALUATION W/MEDICAL SERVICES: ICD-10-PCS | Mod: AF,HB,, | Performed by: PSYCHIATRY & NEUROLOGY

## 2021-04-28 PROCEDURE — 99212 OFFICE O/P EST SF 10 MIN: CPT | Mod: PBBFAC | Performed by: PSYCHIATRY & NEUROLOGY

## 2021-04-28 PROCEDURE — 90792 PSYCH DIAG EVAL W/MED SRVCS: CPT | Mod: AF,HB,, | Performed by: PSYCHIATRY & NEUROLOGY

## 2021-04-28 PROCEDURE — 99999 PR PBB SHADOW E&M-EST. PATIENT-LVL II: CPT | Mod: PBBFAC,AF,HB, | Performed by: PSYCHIATRY & NEUROLOGY

## 2021-04-28 PROCEDURE — 99999 PR PBB SHADOW E&M-EST. PATIENT-LVL II: ICD-10-PCS | Mod: PBBFAC,AF,HB, | Performed by: PSYCHIATRY & NEUROLOGY

## 2021-04-28 RX ORDER — ARIPIPRAZOLE 10 MG/1
10 TABLET ORAL DAILY
Qty: 30 TABLET | Refills: 2 | Status: SHIPPED | OUTPATIENT
Start: 2021-04-28 | End: 2021-07-12

## 2021-05-04 ENCOUNTER — TELEPHONE (OUTPATIENT)
Dept: OBSTETRICS AND GYNECOLOGY | Facility: CLINIC | Age: 56
End: 2021-05-04

## 2021-05-05 ENCOUNTER — OFFICE VISIT (OUTPATIENT)
Dept: OBSTETRICS AND GYNECOLOGY | Facility: CLINIC | Age: 56
End: 2021-05-05
Payer: MEDICAID

## 2021-05-05 VITALS
HEIGHT: 65 IN | SYSTOLIC BLOOD PRESSURE: 128 MMHG | WEIGHT: 122.13 LBS | BODY MASS INDEX: 20.35 KG/M2 | DIASTOLIC BLOOD PRESSURE: 84 MMHG

## 2021-05-05 DIAGNOSIS — Z12.4 PAPANICOLAOU SMEAR FOR CERVICAL CANCER SCREENING: ICD-10-CM

## 2021-05-05 DIAGNOSIS — Z01.419 ROUTINE GYNECOLOGICAL EXAMINATION: Primary | ICD-10-CM

## 2021-05-05 DIAGNOSIS — Z12.4 CERVICAL CANCER SCREENING: ICD-10-CM

## 2021-05-05 PROCEDURE — 88142 CYTOPATH C/V THIN LAYER: CPT | Performed by: PATHOLOGY

## 2021-05-05 PROCEDURE — 99999 PR PBB SHADOW E&M-EST. PATIENT-LVL III: ICD-10-PCS | Mod: PBBFAC,,, | Performed by: NURSE PRACTITIONER

## 2021-05-05 PROCEDURE — 99386 PR PREVENTIVE VISIT,NEW,40-64: ICD-10-PCS | Mod: S$PBB,,, | Performed by: NURSE PRACTITIONER

## 2021-05-05 PROCEDURE — 88141 CYTOPATH C/V INTERPRET: CPT | Mod: ,,, | Performed by: PATHOLOGY

## 2021-05-05 PROCEDURE — 99386 PREV VISIT NEW AGE 40-64: CPT | Mod: S$PBB,,, | Performed by: NURSE PRACTITIONER

## 2021-05-05 PROCEDURE — 99999 PR PBB SHADOW E&M-EST. PATIENT-LVL III: CPT | Mod: PBBFAC,,, | Performed by: NURSE PRACTITIONER

## 2021-05-05 PROCEDURE — 88141 PR  CYTOPATH CERV/VAG INTERPRET: ICD-10-PCS | Mod: ,,, | Performed by: PATHOLOGY

## 2021-05-05 PROCEDURE — 99213 OFFICE O/P EST LOW 20 MIN: CPT | Mod: PBBFAC | Performed by: NURSE PRACTITIONER

## 2021-05-05 PROCEDURE — 87624 HPV HI-RISK TYP POOLED RSLT: CPT | Performed by: NURSE PRACTITIONER

## 2021-05-12 LAB
HPV HR 12 DNA SPEC QL NAA+PROBE: NEGATIVE
HPV16 AG SPEC QL: NEGATIVE
HPV18 DNA SPEC QL NAA+PROBE: NEGATIVE

## 2021-05-19 DIAGNOSIS — G40.909 SEIZURE DISORDER: ICD-10-CM

## 2021-05-20 RX ORDER — LEVETIRACETAM 500 MG/1
1000 TABLET ORAL 2 TIMES DAILY
Qty: 120 TABLET | Refills: 0 | Status: SHIPPED | OUTPATIENT
Start: 2021-05-20 | End: 2021-06-30 | Stop reason: SDUPTHER

## 2021-05-27 ENCOUNTER — TELEPHONE (OUTPATIENT)
Dept: OBSTETRICS AND GYNECOLOGY | Facility: CLINIC | Age: 56
End: 2021-05-27

## 2021-06-22 DIAGNOSIS — I10 ESSENTIAL HYPERTENSION: ICD-10-CM

## 2021-06-22 RX ORDER — AMLODIPINE BESYLATE 10 MG/1
10 TABLET ORAL DAILY
Qty: 90 TABLET | Refills: 2 | Status: SHIPPED | OUTPATIENT
Start: 2021-06-22 | End: 2022-02-17

## 2021-06-30 ENCOUNTER — OFFICE VISIT (OUTPATIENT)
Dept: INTERNAL MEDICINE | Facility: CLINIC | Age: 56
End: 2021-06-30
Payer: MEDICAID

## 2021-06-30 VITALS
RESPIRATION RATE: 16 BRPM | DIASTOLIC BLOOD PRESSURE: 76 MMHG | TEMPERATURE: 98 F | HEART RATE: 92 BPM | WEIGHT: 124.13 LBS | BODY MASS INDEX: 20.68 KG/M2 | HEIGHT: 65 IN | SYSTOLIC BLOOD PRESSURE: 122 MMHG | OXYGEN SATURATION: 97 %

## 2021-06-30 DIAGNOSIS — G40.909 SEIZURE DISORDER: ICD-10-CM

## 2021-06-30 DIAGNOSIS — I10 HYPERTENSION GOAL BP (BLOOD PRESSURE) < 130/80: Primary | ICD-10-CM

## 2021-06-30 DIAGNOSIS — E11.9 CONTROLLED TYPE 2 DIABETES MELLITUS WITHOUT COMPLICATION, WITHOUT LONG-TERM CURRENT USE OF INSULIN: ICD-10-CM

## 2021-06-30 PROCEDURE — 99214 PR OFFICE/OUTPT VISIT, EST, LEVL IV, 30-39 MIN: ICD-10-PCS | Mod: S$PBB,,, | Performed by: INTERNAL MEDICINE

## 2021-06-30 PROCEDURE — 99213 OFFICE O/P EST LOW 20 MIN: CPT | Mod: PBBFAC | Performed by: INTERNAL MEDICINE

## 2021-06-30 PROCEDURE — 99999 PR PBB SHADOW E&M-EST. PATIENT-LVL III: ICD-10-PCS | Mod: PBBFAC,,, | Performed by: INTERNAL MEDICINE

## 2021-06-30 PROCEDURE — 99214 OFFICE O/P EST MOD 30 MIN: CPT | Mod: S$PBB,,, | Performed by: INTERNAL MEDICINE

## 2021-06-30 PROCEDURE — 99999 PR PBB SHADOW E&M-EST. PATIENT-LVL III: CPT | Mod: PBBFAC,,, | Performed by: INTERNAL MEDICINE

## 2021-06-30 RX ORDER — LEVETIRACETAM 500 MG/1
1000 TABLET ORAL 2 TIMES DAILY
Qty: 120 TABLET | Refills: 6 | Status: SHIPPED | OUTPATIENT
Start: 2021-06-30 | End: 2022-02-18 | Stop reason: SDUPTHER

## 2021-08-08 ENCOUNTER — PATIENT OUTREACH (OUTPATIENT)
Dept: ADMINISTRATIVE | Facility: OTHER | Age: 56
End: 2021-08-08

## 2021-08-09 ENCOUNTER — TELEPHONE (OUTPATIENT)
Dept: OBSTETRICS AND GYNECOLOGY | Facility: CLINIC | Age: 56
End: 2021-08-09

## 2021-08-13 RX ORDER — ARIPIPRAZOLE 10 MG/1
10 TABLET ORAL DAILY
Qty: 30 TABLET | Refills: 1 | Status: SHIPPED | OUTPATIENT
Start: 2021-08-13 | End: 2021-08-25 | Stop reason: SDUPTHER

## 2021-08-17 ENCOUNTER — PATIENT MESSAGE (OUTPATIENT)
Dept: ADMINISTRATIVE | Facility: OTHER | Age: 56
End: 2021-08-17

## 2021-08-17 ENCOUNTER — IMMUNIZATION (OUTPATIENT)
Dept: PRIMARY CARE CLINIC | Facility: CLINIC | Age: 56
End: 2021-08-17
Payer: MEDICAID

## 2021-08-17 DIAGNOSIS — Z23 NEED FOR VACCINATION: Primary | ICD-10-CM

## 2021-08-17 PROCEDURE — 0001A COVID-19, MRNA, LNP-S, PF, 30 MCG/0.3 ML DOSE VACCINE: ICD-10-PCS | Mod: CV19,S$GLB,, | Performed by: FAMILY MEDICINE

## 2021-08-17 PROCEDURE — 91300 COVID-19, MRNA, LNP-S, PF, 30 MCG/0.3 ML DOSE VACCINE: ICD-10-PCS | Mod: S$GLB,,, | Performed by: FAMILY MEDICINE

## 2021-08-17 PROCEDURE — 91300 COVID-19, MRNA, LNP-S, PF, 30 MCG/0.3 ML DOSE VACCINE: CPT | Mod: S$GLB,,, | Performed by: FAMILY MEDICINE

## 2021-08-17 PROCEDURE — 0001A COVID-19, MRNA, LNP-S, PF, 30 MCG/0.3 ML DOSE VACCINE: CPT | Mod: CV19,S$GLB,, | Performed by: FAMILY MEDICINE

## 2021-08-25 RX ORDER — ARIPIPRAZOLE 10 MG/1
10 TABLET ORAL DAILY
Qty: 30 TABLET | Refills: 1 | Status: SHIPPED | OUTPATIENT
Start: 2021-08-25 | End: 2022-12-02

## 2021-09-07 ENCOUNTER — IMMUNIZATION (OUTPATIENT)
Dept: PRIMARY CARE CLINIC | Facility: CLINIC | Age: 56
End: 2021-09-07
Payer: MEDICAID

## 2021-09-07 DIAGNOSIS — Z23 NEED FOR VACCINATION: Primary | ICD-10-CM

## 2021-09-07 PROCEDURE — 0002A COVID-19, MRNA, LNP-S, PF, 30 MCG/0.3 ML DOSE VACCINE: CPT | Mod: CV19,S$GLB,, | Performed by: FAMILY MEDICINE

## 2021-09-07 PROCEDURE — 0002A COVID-19, MRNA, LNP-S, PF, 30 MCG/0.3 ML DOSE VACCINE: ICD-10-PCS | Mod: CV19,S$GLB,, | Performed by: FAMILY MEDICINE

## 2021-09-07 PROCEDURE — 91300 COVID-19, MRNA, LNP-S, PF, 30 MCG/0.3 ML DOSE VACCINE: ICD-10-PCS | Mod: S$GLB,,, | Performed by: FAMILY MEDICINE

## 2021-09-07 PROCEDURE — 91300 COVID-19, MRNA, LNP-S, PF, 30 MCG/0.3 ML DOSE VACCINE: CPT | Mod: S$GLB,,, | Performed by: FAMILY MEDICINE

## 2021-09-23 ENCOUNTER — PATIENT OUTREACH (OUTPATIENT)
Dept: ADMINISTRATIVE | Facility: HOSPITAL | Age: 56
End: 2021-09-23

## 2021-12-02 ENCOUNTER — PATIENT MESSAGE (OUTPATIENT)
Dept: ENDOSCOPY | Facility: HOSPITAL | Age: 56
End: 2021-12-02
Payer: MEDICAID

## 2021-12-27 ENCOUNTER — PATIENT MESSAGE (OUTPATIENT)
Dept: INTERNAL MEDICINE | Facility: CLINIC | Age: 56
End: 2021-12-27
Payer: MEDICAID

## 2022-01-14 ENCOUNTER — PATIENT MESSAGE (OUTPATIENT)
Dept: INTERNAL MEDICINE | Facility: CLINIC | Age: 57
End: 2022-01-14
Payer: MEDICAID

## 2022-02-16 DIAGNOSIS — I10 ESSENTIAL HYPERTENSION: ICD-10-CM

## 2022-02-16 NOTE — TELEPHONE ENCOUNTER
No new care gaps identified.  Powered by [employer]. Reference number: [id number].   2/16/2022 5:22:46 PM CST

## 2022-02-17 RX ORDER — AMLODIPINE BESYLATE 10 MG/1
10 TABLET ORAL DAILY
Qty: 90 TABLET | Refills: 1 | Status: SHIPPED | OUTPATIENT
Start: 2022-02-17 | End: 2022-09-20

## 2022-02-17 NOTE — TELEPHONE ENCOUNTER
Refill Authorization Note   Sera Whelan  is requesting a refill authorization.  Brief Assessment and Rationale for Refill:  Approve     Medication Therapy Plan:       Medication Reconciliation Completed: No   Comments:   --->Care Gap information included below if applicable.   Orders Placed This Encounter    amLODIPine (NORVASC) 10 MG tablet      Requested Prescriptions   Signed Prescriptions Disp Refills    amLODIPine (NORVASC) 10 MG tablet 90 tablet 1     Sig: Take 1 tablet (10 mg total) by mouth once daily.       Cardiovascular:  Calcium Channel Blockers Passed - 2/16/2022  5:22 PM        Passed - Patient is at least 18 years old        Passed - Last BP in normal range within 360 days     BP Readings from Last 1 Encounters:   06/30/21 122/76               Passed - Valid encounter within last 15 months     Recent Visits  Date Type Provider Dept   06/30/21 Office Visit Rosario Lay DO [facility] Internal Medicine   03/09/21 Office Visit Rosario Lay DO [facility]   02/26/21 Office Visit Rosario Lay, DO [facility] Internal Medicine   02/19/21 Office Visit Rosario Lay DO [facility] Internal Medicine   Showing recent visits within past 720 days and meeting all other requirements  Future Appointments  No visits were found meeting these conditions.  Showing future appointments within next 150 days and meeting all other requirements      Future Appointments              Tomorrow Rosario Lay DO [facility]                    Appointments  past 12m or future 3m with PCP    Date Provider   Last Visit   6/30/2021 Rosario Lay DO   Next Visit   2/18/2022 Rosario Lay DO   ED visits in past 90 days: 0     Note composed:5:46 AM 02/17/2022

## 2022-02-18 ENCOUNTER — OFFICE VISIT (OUTPATIENT)
Dept: INTERNAL MEDICINE | Facility: CLINIC | Age: 57
End: 2022-02-18
Payer: MEDICAID

## 2022-02-18 VITALS
SYSTOLIC BLOOD PRESSURE: 116 MMHG | TEMPERATURE: 99 F | DIASTOLIC BLOOD PRESSURE: 72 MMHG | HEART RATE: 100 BPM | RESPIRATION RATE: 18 BRPM | HEIGHT: 65 IN | OXYGEN SATURATION: 98 % | BODY MASS INDEX: 23.03 KG/M2 | WEIGHT: 138.25 LBS

## 2022-02-18 DIAGNOSIS — Z00.00 ANNUAL PHYSICAL EXAM: Primary | ICD-10-CM

## 2022-02-18 DIAGNOSIS — G40.909 SEIZURE DISORDER: ICD-10-CM

## 2022-02-18 DIAGNOSIS — Z12.31 ENCOUNTER FOR SCREENING MAMMOGRAM FOR MALIGNANT NEOPLASM OF BREAST: ICD-10-CM

## 2022-02-18 DIAGNOSIS — Z23 IMMUNIZATION DUE: ICD-10-CM

## 2022-02-18 DIAGNOSIS — I10 ESSENTIAL HYPERTENSION: ICD-10-CM

## 2022-02-18 PROCEDURE — 99396 PREV VISIT EST AGE 40-64: CPT | Mod: S$PBB,,, | Performed by: INTERNAL MEDICINE

## 2022-02-18 PROCEDURE — 3078F PR MOST RECENT DIASTOLIC BLOOD PRESSURE < 80 MM HG: ICD-10-PCS | Mod: CPTII,,, | Performed by: INTERNAL MEDICINE

## 2022-02-18 PROCEDURE — 3078F DIAST BP <80 MM HG: CPT | Mod: CPTII,,, | Performed by: INTERNAL MEDICINE

## 2022-02-18 PROCEDURE — 99214 OFFICE O/P EST MOD 30 MIN: CPT | Mod: PBBFAC | Performed by: INTERNAL MEDICINE

## 2022-02-18 PROCEDURE — 99396 PR PREVENTIVE VISIT,EST,40-64: ICD-10-PCS | Mod: S$PBB,,, | Performed by: INTERNAL MEDICINE

## 2022-02-18 PROCEDURE — 99999 PR PBB SHADOW E&M-EST. PATIENT-LVL IV: ICD-10-PCS | Mod: PBBFAC,,, | Performed by: INTERNAL MEDICINE

## 2022-02-18 PROCEDURE — 1159F PR MEDICATION LIST DOCUMENTED IN MEDICAL RECORD: ICD-10-PCS | Mod: CPTII,,, | Performed by: INTERNAL MEDICINE

## 2022-02-18 PROCEDURE — 3008F BODY MASS INDEX DOCD: CPT | Mod: CPTII,,, | Performed by: INTERNAL MEDICINE

## 2022-02-18 PROCEDURE — 1160F RVW MEDS BY RX/DR IN RCRD: CPT | Mod: CPTII,,, | Performed by: INTERNAL MEDICINE

## 2022-02-18 PROCEDURE — 3074F SYST BP LT 130 MM HG: CPT | Mod: CPTII,,, | Performed by: INTERNAL MEDICINE

## 2022-02-18 PROCEDURE — 99999 PR PBB SHADOW E&M-EST. PATIENT-LVL IV: CPT | Mod: PBBFAC,,, | Performed by: INTERNAL MEDICINE

## 2022-02-18 PROCEDURE — 1160F PR REVIEW ALL MEDS BY PRESCRIBER/CLIN PHARMACIST DOCUMENTED: ICD-10-PCS | Mod: CPTII,,, | Performed by: INTERNAL MEDICINE

## 2022-02-18 PROCEDURE — 3074F PR MOST RECENT SYSTOLIC BLOOD PRESSURE < 130 MM HG: ICD-10-PCS | Mod: CPTII,,, | Performed by: INTERNAL MEDICINE

## 2022-02-18 PROCEDURE — 3008F PR BODY MASS INDEX (BMI) DOCUMENTED: ICD-10-PCS | Mod: CPTII,,, | Performed by: INTERNAL MEDICINE

## 2022-02-18 PROCEDURE — 1159F MED LIST DOCD IN RCRD: CPT | Mod: CPTII,,, | Performed by: INTERNAL MEDICINE

## 2022-02-18 PROCEDURE — 90686 IIV4 VACC NO PRSV 0.5 ML IM: CPT | Mod: PBBFAC

## 2022-02-18 RX ORDER — LEVETIRACETAM 500 MG/1
1000 TABLET ORAL 2 TIMES DAILY
Qty: 120 TABLET | Refills: 6 | Status: SHIPPED | OUTPATIENT
Start: 2022-02-18 | End: 2023-06-05

## 2022-02-18 NOTE — PROGRESS NOTES
Sera Whelan  56 y.o. Black or  female  [id number]    Chief Complaint:  Chief Complaint   Patient presents with    Annual Exam       History of Present Illness:  Presents to the clinic with her son.   HTN--stable on amlodipine.   Seizure disorder--stable on Keppra.     Laboratory   Lab Results   Component Value Date    WBC 4.67 05/29/2018    HGB 13.9 05/29/2018    HCT 40.3 05/29/2018     05/29/2018    CHOL 179 02/12/2021    TRIG 163 (H) 02/12/2021    HDL 38 (L) 02/12/2021    ALT 11 02/12/2021    AST 14 02/12/2021     02/12/2021    K 3.6 02/12/2021     02/12/2021    CREATININE 0.9 02/12/2021    BUN 11 02/12/2021    CO2 29 02/12/2021    TSH 1.109 05/23/2018    INR 1.0 02/01/2015    HGBA1C 5.9 (H) 02/12/2021     Review of Systems   Constitutional: Negative for fever and weight loss.   Respiratory: Negative for cough and shortness of breath.    Cardiovascular: Negative for chest pain and leg swelling.   Gastrointestinal: Negative for abdominal pain.   Genitourinary: Negative for dysuria.   Musculoskeletal: Negative for joint pain.   Neurological: Negative for dizziness, seizures and headaches.       The following were reviewed: Active problem list, medication list, allergies, family history, social history, and Health Maintenance.     History:  Past Medical History:   Diagnosis Date    Bipolar 1 disorder     Diabetes mellitus     Encounter for blood transfusion     Hypertension     Seizures     Stroke      Past Surgical History:   Procedure Laterality Date    COLONOSCOPY N/A 3/30/2021    Procedure: COLONOSCOPY;  Surgeon: Tani Rust MD;  Location[facility];  Service: Endoscopy;  Laterality: N/A;     Family History   Problem Relation Age of Onset    Diabetes Mother     Stroke Mother      Social History     Socioeconomic History    Marital status:    Tobacco Use    Smoking status: Never Smoker    Smokeless tobacco: Never Used   Substance and Sexual Activity     Alcohol use: No     Alcohol/week: 0.0 standard drinks    Drug use: No    Sexual activity: Not Currently     Patient Active Problem List   Diagnosis    Seizure    Bipolar 1 disorder, mixed    H/O: stroke with residual effects    Menorrhagia    Essential hypertension    Anemia    Colon cancer screening     Review of patient's allergies indicates:  No Known Allergies    Health Maintenance  Health Maintenance Topics with due status: Not Due       Topic Last Completion Date    TETANUS VACCINE 09/25/2017    Lipid Panel 02/12/2021    Cervical Cancer Screening 05/05/2021    Colorectal Cancer Screening 07/15/2021     Health Maintenance Due   Topic Date Due    Shingles Vaccine (1 of 2) Never done    Mammogram  03/03/2022    COVID-19 Vaccine (3 - Booster for Pfizer series) 03/07/2022       Medications:  Current Outpatient Medications on File Prior to Visit   Medication Sig Dispense Refill    amLODIPine (NORVASC) 10 MG tablet Take 1 tablet (10 mg total) by mouth once daily. 90 tablet 1    ARIPiprazole (ABILIFY) 10 MG Tab Take 1 tablet (10 mg total) by mouth once daily. 30 tablet 1    benztropine (COGENTIN) 1 MG tablet Take 1 mg by mouth.      [DISCONTINUED] levETIRAcetam (KEPPRA) 500 MG Tab Take 2 tablets (1,000 mg total) by mouth 2 (two) times daily. 120 tablet 6     No current facility-administered medications on file prior to visit.       Medications have been reviewed and reconciled with patient at visit today.    Exam:  Vitals:    02/18/22 1418   BP: 116/72   Pulse: 100   Resp: 18   Temp: 98.9 °F (37.2 °C)     Weight: 62.7 kg (138 lb 3.7 oz)   Body mass index is 23 kg/m².      Physical Exam  Vitals reviewed.   Constitutional:       General: She is not in acute distress.     Appearance: She is well-developed. She is not ill-appearing.   Eyes:      General: No scleral icterus.  Cardiovascular:      Rate and Rhythm: Normal rate and regular rhythm.      Heart sounds: Normal heart sounds.   Pulmonary:       Effort: Pulmonary effort is normal. No respiratory distress.      Breath sounds: Normal breath sounds. No wheezing or rales.   Abdominal:      General: Bowel sounds are normal.      Palpations: Abdomen is soft.   Skin:     General: Skin is warm and dry.   Neurological:      Mental Status: She is alert.   Psychiatric:         Behavior: Behavior normal.         Assessment:  The primary encounter diagnosis was Annual physical exam. Diagnoses of Essential hypertension, Seizure disorder, Encounter for screening mammogram for malignant neoplasm of breast, and Immunization due were also pertinent to this visit.    Plan:  Annual physical exam  -     Counseled regarding age appropriate screenings and immunizations  -     Counseled regarding lifestyle modifications  -     Comprehensive Metabolic Panel; Future; Expected date: 02/18/2022  -     TSH; Future  -     CBC Auto Differential; Future; Expected date: 02/18/2022  -     Lipid panel; Future; Expected date: 02/18/2022    Essential hypertension  -     Continue amlodipine    Seizure disorder  -     Refill levETIRAcetam (KEPPRA) 500 MG Tab; Take 2 tablets (1,000 mg total) by mouth 2 (two) times daily.  Dispense: 120 tablet; Refill: 6    Encounter for screening mammogram for malignant neoplasm of breast  -     Mammo Digital Screening Bilat w/ [facility]; Future    Immunization due  -     Influenza - Quadrivalent (PF)    Schedule labs.     Follow up in about 1 year (around 2/18/2023).

## 2022-02-19 ENCOUNTER — LAB VISIT (OUTPATIENT)
Dept: LAB | Facility: HOSPITAL | Age: 57
End: 2022-02-19
Attending: INTERNAL MEDICINE
Payer: MEDICAID

## 2022-02-19 DIAGNOSIS — Z00.00 ANNUAL PHYSICAL EXAM: ICD-10-CM

## 2022-02-19 LAB
ALBUMIN SERPL BCP-MCNC: 3.3 G/DL (ref 3.5–5.2)
ALP SERPL-CCNC: 152 U/L (ref 55–135)
ALT SERPL W/O P-5'-P-CCNC: 50 U/L (ref 10–44)
ANION GAP SERPL CALC-SCNC: 10 MMOL/L (ref 8–16)
AST SERPL-CCNC: 35 U/L (ref 10–40)
BASOPHILS # BLD AUTO: 0.03 K/UL (ref 0–0.2)
BASOPHILS NFR BLD: 0.6 % (ref 0–1.9)
BILIRUB SERPL-MCNC: 0.6 MG/DL (ref 0.1–1)
BUN SERPL-MCNC: 9 MG/DL (ref 6–20)
CALCIUM SERPL-MCNC: 9.9 MG/DL (ref 8.7–10.5)
CHLORIDE SERPL-SCNC: 105 MMOL/L (ref 95–110)
CHOLEST SERPL-MCNC: 236 MG/DL (ref 120–199)
CHOLEST/HDLC SERPL: 5.1 {RATIO} (ref 2–5)
CO2 SERPL-SCNC: 27 MMOL/L (ref 23–29)
CREAT SERPL-MCNC: 0.7 MG/DL (ref 0.5–1.4)
DIFFERENTIAL METHOD: ABNORMAL
EOSINOPHIL # BLD AUTO: 0.2 K/UL (ref 0–0.5)
EOSINOPHIL NFR BLD: 3.5 % (ref 0–8)
ERYTHROCYTE [DISTWIDTH] IN BLOOD BY AUTOMATED COUNT: 13.2 % (ref 11.5–14.5)
EST. GFR  (AFRICAN AMERICAN): >60 ML/MIN/1.73 M^2
EST. GFR  (NON AFRICAN AMERICAN): >60 ML/MIN/1.73 M^2
GLUCOSE SERPL-MCNC: 170 MG/DL (ref 70–110)
HCT VFR BLD AUTO: 41 % (ref 37–48.5)
HDLC SERPL-MCNC: 46 MG/DL (ref 40–75)
HDLC SERPL: 19.5 % (ref 20–50)
HGB BLD-MCNC: 12.6 G/DL (ref 12–16)
IMM GRANULOCYTES # BLD AUTO: 0.01 K/UL (ref 0–0.04)
IMM GRANULOCYTES NFR BLD AUTO: 0.2 % (ref 0–0.5)
LDLC SERPL CALC-MCNC: 165 MG/DL (ref 63–159)
LYMPHOCYTES # BLD AUTO: 1 K/UL (ref 1–4.8)
LYMPHOCYTES NFR BLD: 19.9 % (ref 18–48)
MCH RBC QN AUTO: 30.1 PG (ref 27–31)
MCHC RBC AUTO-ENTMCNC: 30.7 G/DL (ref 32–36)
MCV RBC AUTO: 98 FL (ref 82–98)
MONOCYTES # BLD AUTO: 0.5 K/UL (ref 0.3–1)
MONOCYTES NFR BLD: 9.5 % (ref 4–15)
NEUTROPHILS # BLD AUTO: 3.2 K/UL (ref 1.8–7.7)
NEUTROPHILS NFR BLD: 66.3 % (ref 38–73)
NONHDLC SERPL-MCNC: 190 MG/DL
NRBC BLD-RTO: 0 /100 WBC
PLATELET # BLD AUTO: 281 K/UL (ref 150–450)
PMV BLD AUTO: 10.8 FL (ref 9.2–12.9)
POTASSIUM SERPL-SCNC: 3.8 MMOL/L (ref 3.5–5.1)
PROT SERPL-MCNC: 7.8 G/DL (ref 6–8.4)
RBC # BLD AUTO: 4.18 M/UL (ref 4–5.4)
SODIUM SERPL-SCNC: 142 MMOL/L (ref 136–145)
TRIGL SERPL-MCNC: 125 MG/DL (ref 30–150)
TSH SERPL DL<=0.005 MIU/L-ACNC: 1.26 UIU/ML (ref 0.4–4)
WBC # BLD AUTO: 4.82 K/UL (ref 3.9–12.7)

## 2022-02-19 PROCEDURE — 80053 COMPREHEN METABOLIC PANEL: CPT | Performed by: INTERNAL MEDICINE

## 2022-02-19 PROCEDURE — 80061 LIPID PANEL: CPT | Performed by: INTERNAL MEDICINE

## 2022-02-19 PROCEDURE — 84443 ASSAY THYROID STIM HORMONE: CPT | Performed by: INTERNAL MEDICINE

## 2022-02-19 PROCEDURE — 36415 COLL VENOUS BLD VENIPUNCTURE: CPT | Performed by: INTERNAL MEDICINE

## 2022-02-19 PROCEDURE — 85025 COMPLETE CBC W/AUTO DIFF WBC: CPT | Performed by: INTERNAL MEDICINE

## 2022-02-21 ENCOUNTER — TELEPHONE (OUTPATIENT)
Dept: INTERNAL MEDICINE | Facility: CLINIC | Age: 57
End: 2022-02-21
Payer: MEDICAID

## 2022-02-21 ENCOUNTER — PATIENT MESSAGE (OUTPATIENT)
Dept: INTERNAL MEDICINE | Facility: CLINIC | Age: 57
End: 2022-02-21
Payer: MEDICAID

## 2022-02-21 DIAGNOSIS — I10 ESSENTIAL HYPERTENSION: ICD-10-CM

## 2022-02-21 DIAGNOSIS — R73.01 IMPAIRED FASTING GLUCOSE: Primary | ICD-10-CM

## 2022-03-19 ENCOUNTER — LAB VISIT (OUTPATIENT)
Dept: LAB | Facility: HOSPITAL | Age: 57
End: 2022-03-19
Attending: INTERNAL MEDICINE
Payer: MEDICAID

## 2022-03-19 DIAGNOSIS — I10 ESSENTIAL HYPERTENSION: ICD-10-CM

## 2022-03-19 LAB
CHOLEST SERPL-MCNC: 179 MG/DL (ref 120–199)
CHOLEST/HDLC SERPL: 5 {RATIO} (ref 2–5)
HDLC SERPL-MCNC: 36 MG/DL (ref 40–75)
HDLC SERPL: 20.1 % (ref 20–50)
LDLC SERPL CALC-MCNC: 106.2 MG/DL (ref 63–159)
NONHDLC SERPL-MCNC: 143 MG/DL
TRIGL SERPL-MCNC: 184 MG/DL (ref 30–150)

## 2022-03-19 PROCEDURE — 36415 COLL VENOUS BLD VENIPUNCTURE: CPT | Performed by: INTERNAL MEDICINE

## 2022-03-19 PROCEDURE — 80061 LIPID PANEL: CPT | Performed by: INTERNAL MEDICINE

## 2022-04-26 ENCOUNTER — PATIENT MESSAGE (OUTPATIENT)
Dept: ADMINISTRATIVE | Facility: HOSPITAL | Age: 57
End: 2022-04-26
Payer: MEDICAID

## 2022-05-09 ENCOUNTER — PATIENT MESSAGE (OUTPATIENT)
Dept: ADMINISTRATIVE | Facility: HOSPITAL | Age: 57
End: 2022-05-09
Payer: MEDICAID

## 2022-06-10 ENCOUNTER — HOSPITAL ENCOUNTER (OUTPATIENT)
Dept: RADIOLOGY | Facility: HOSPITAL | Age: 57
Discharge: HOME | End: 2022-06-10
Attending: INTERNAL MEDICINE
Payer: MEDICAID

## 2022-06-10 VITALS — BODY MASS INDEX: 23.03 KG/M2 | WEIGHT: 138.25 LBS | HEIGHT: 65 IN

## 2022-06-10 DIAGNOSIS — Z12.31 ENCOUNTER FOR SCREENING MAMMOGRAM FOR MALIGNANT NEOPLASM OF BREAST: ICD-10-CM

## 2022-06-10 PROCEDURE — 77063 MAMMO DIGITAL SCREENING BILAT WITH TOMO: ICD-10-PCS | Mod: 26,,, | Performed by: RADIOLOGY

## 2022-06-10 PROCEDURE — 77063 BREAST TOMOSYNTHESIS BI: CPT | Mod: TC

## 2022-06-10 PROCEDURE — 77067 SCR MAMMO BI INCL CAD: CPT | Mod: 26,,, | Performed by: RADIOLOGY

## 2022-06-10 PROCEDURE — 77063 BREAST TOMOSYNTHESIS BI: CPT | Mod: 26,,, | Performed by: RADIOLOGY

## 2022-06-10 PROCEDURE — 77067 MAMMO DIGITAL SCREENING BILAT WITH TOMO: ICD-10-PCS | Mod: 26,,, | Performed by: RADIOLOGY

## 2022-06-27 RX ORDER — ARIPIPRAZOLE 10 MG/1
10 TABLET ORAL DAILY
Qty: 30 TABLET | Refills: 1 | OUTPATIENT
Start: 2022-06-27

## 2022-07-13 ENCOUNTER — PATIENT MESSAGE (OUTPATIENT)
Dept: PSYCHIATRY | Facility: CLINIC | Age: 57
End: 2022-07-13
Payer: MEDICAID

## 2022-07-25 ENCOUNTER — PATIENT MESSAGE (OUTPATIENT)
Dept: PSYCHIATRY | Facility: CLINIC | Age: 57
End: 2022-07-25
Payer: MEDICAID

## 2022-09-28 ENCOUNTER — TELEPHONE (OUTPATIENT)
Dept: PSYCHIATRY | Facility: CLINIC | Age: 57
End: 2022-09-28
Payer: MEDICAID

## 2022-10-02 ENCOUNTER — PATIENT MESSAGE (OUTPATIENT)
Dept: PSYCHIATRY | Facility: CLINIC | Age: 57
End: 2022-10-02
Payer: MEDICAID

## 2022-11-30 ENCOUNTER — TELEPHONE (OUTPATIENT)
Dept: PSYCHIATRY | Facility: CLINIC | Age: 57
End: 2022-11-30
Payer: MEDICAID

## 2022-12-02 ENCOUNTER — OFFICE VISIT (OUTPATIENT)
Dept: PSYCHIATRY | Facility: CLINIC | Age: 57
End: 2022-12-02
Payer: MEDICAID

## 2022-12-02 DIAGNOSIS — F20.9 SCHIZOPHRENIA, UNSPECIFIED TYPE: Primary | ICD-10-CM

## 2022-12-02 PROCEDURE — 99999 PR PBB SHADOW E&M-EST. PATIENT-LVL I: CPT | Mod: PBBFAC,AF,HB, | Performed by: PSYCHIATRY & NEUROLOGY

## 2022-12-02 PROCEDURE — 99999 PR PBB SHADOW E&M-EST. PATIENT-LVL I: ICD-10-PCS | Mod: PBBFAC,AF,HB, | Performed by: PSYCHIATRY & NEUROLOGY

## 2022-12-02 PROCEDURE — 99214 PR OFFICE/OUTPT VISIT, EST, LEVL IV, 30-39 MIN: ICD-10-PCS | Mod: S$PBB,AF,HB, | Performed by: PSYCHIATRY & NEUROLOGY

## 2022-12-02 PROCEDURE — 99211 OFF/OP EST MAY X REQ PHY/QHP: CPT | Mod: PBBFAC | Performed by: PSYCHIATRY & NEUROLOGY

## 2022-12-02 PROCEDURE — 99214 OFFICE O/P EST MOD 30 MIN: CPT | Mod: S$PBB,AF,HB, | Performed by: PSYCHIATRY & NEUROLOGY

## 2022-12-02 RX ORDER — RISPERIDONE 2 MG/1
TABLET ORAL
Qty: 30 TABLET | Refills: 3 | Status: SHIPPED | OUTPATIENT
Start: 2022-12-02 | End: 2023-04-03 | Stop reason: SDUPTHER

## 2022-12-02 NOTE — PROGRESS NOTES
"Outpatient Psychiatry Follow-up Visit (MD/NP)    12/2/2022    Sera Whelan, a 57 y.o. female, presenting for follow-up visit. Met with patient and son.     Reason for Encounter: follow-up schizophrenia.    Interval Hx: Patient seen and interviewed for follow-up, last seen about nineteen months ago. Medication last prescribed about fifteen months ago. No new general medical complaints. No new medications. Moods are irritable, at times suspicious. Hallucinations intermittently. Does basic activities of daily living. Relies on son and daughter Lives with her son. Daughter works at a restaurant. Reports aripiprazole was effective, well-tolerated.     Background: Pt is a 55 year old woman who presents for establishment of care. From ED note:     PMH of HTN, seizures, CVA, & schizophrenia who presents to the ED on an OPC secondary to bizarre behavior. OPC states pt has been spraying cleaning products on her bed, pouring food/text into fridge, & cursing/yelling for the reason. Pt is uncooperative with exam & only answers questions intermittently. She reports compliance with her home meds & denies SI, HI, AH, VH, chest pain, shortness of breath, abdominal pain, nausea, vomiting, diarrhea, fever, headache, neck pain, & all other physical complaints at this time. Additional history is unobtainable secondary to psychiatric disorder & pt cooperation.     UDS - ; no psych hospitalization.     Meds: haldol, keppra    Patient and family are generally poor historians but roughly confirm the above history and they report patient has history of schizophrenia, off medications for several years. Last care was about 3 years ago. Sleeps ok. Good appetite for food. Quite low functioning, requires considerable support from adult son, other family.     Psych Hx: [facility] - last 4-5 years ago - kept 8 months to 1 year. "getting up in the middle of the night, pouring water on other people, screaming at us". 3-4 " hospitalizations. Similar to previous episodes. First hospitalization at age 17. Persistent delusions since about 43 years old. Yelling, slamming doors. Doesn't throw things or hit people. Hears mother & father in her ears. Denies avh. Some paranoia. No evident angela.     Typically takes prescribed medication. Doesn't know what previous clinic she went to.     Medical Hx: seizure disorder controlled with medication. Last 2 years ago. HTN. Borderline DM.     Family Hx:     Social Hx: From [place]. Grew up with both parents in the home. 4 living sisters, 1 . Graduated HS. Started working at     [employer] at [employer] x 14 years. Stopped working in . Lost job, mental health problems prevented further employment. Doesn't have a recognized disability. Father supports her. Brother is unemployed.     Lives with  and son. Says that she lives with her parents (they're ).  >35 years. Has 3 kids - son, 2 daughters. They both live in . Claims to be  from  but son says this isn't true. She also believes a sister who is  is still living.     Doesn't do cleaning, cooking, laundry. She bathes herself with assistance with son. He adminstration her medication.     Review Of Systems:     GENERAL:  No weight gain or loss  SKIN:  No rashes or lacerations  HEAD:  No headaches  EYES:  No exophthalmos, jaundice or blindness  EARS:  No dizziness, tinnitus or hearing loss  NOSE:  No changes in smell  MOUTH & THROAT:  No dyskinetic movements or obvious goiter  CHEST:  No shortness of breath, hyperventilation or cough  CARDIOVASCULAR:  No tachycardia or chest pain  ABDOMEN:  No nausea, vomiting, pain, constipation or diarrhea  URINARY:  No frequency, dysuria or sexual dysfunction  ENDOCRINE:  No polydipsia, polyuria  MUSCULOSKELETAL:  No pain or stiffness of the joints  NEUROLOGIC:  No weakness, sensory changes, seizures, confusion, memory loss, tremor or other abnormal  movements    Current Evaluation:     Nutritional Screening: Considering the patient's height and weight, medications, medical history and preferences, should a referral be made to the dietitian? no    Constitutional  Vitals:  Most recent vital signs, dated less than 90 days prior to this appointment, were not reviewed.    There were no vitals filed for this visit.     General:  unremarkable, age appropriate     Musculoskeletal  Muscle Strength/Tone:  no tremor, no tic   Gait & Station:  non-ataxic     Psychiatric  Appearance: casually dressed & groomed;   Behavior: calm,   Cooperation: cooperative with assessment  Speech: normal rate, volume, tone  Thought Process: linear, goal-directed  Thought Content: No suicidal or homicidal ideation; no delusions  Affect:  blunted  Mood: euthymic  Perceptions: No auditory or visual hallucinations  Level of Consciousness: alert throughout interview  Insight: limited   Cognition: Oriented to person, place, time, & situation  Memory: no apparent deficits to general clinical interview; not formally assessed  Attention/Concentration: no apparent deficits to general clinical interview; not formally assessed  Fund of Knowledge: average by vocabulary/education    Laboratory Data  No visits with results within 1 Month(s) from this visit.   Latest known visit with results is:   Lab Visit on 03/19/2022   Component Date Value Ref Range Status    Cholesterol 03/19/2022 179  120 - 199 mg/dL Final    Triglycerides 03/19/2022 184 (H)  30 - 150 mg/dL Final    HDL 03/19/2022 36 (L)  40 - 75 mg/dL Final    LDL Cholesterol 03/19/2022 106.2  63.0 - 159.0 mg/dL Final    HDL/Cholesterol Ratio 03/19/2022 20.1  20.0 - 50.0 % Final    Total Cholesterol/HDL Ratio 03/19/2022 5.0  2.0 - 5.0 Final    Non-HDL Cholesterol 03/19/2022 143  mg/dL Final       Medications  Outpatient Encounter Medications as of 12/2/2022   Medication Sig Dispense Refill    amLODIPine (NORVASC) 10 MG tablet Take 1 tablet by mouth  once daily 90 tablet 1    ARIPiprazole (ABILIFY) 10 MG Tab Take 1 tablet (10 mg total) by mouth once daily. 30 tablet 1    benztropine (COGENTIN) 1 MG tablet Take 1 mg by mouth.      levETIRAcetam (KEPPRA) 500 MG Tab Take 2 tablets (1,000 mg total) by mouth 2 (two) times daily. 120 tablet 6     No facility-administered encounter medications on file as of 12/2/2022.     Assessment - Diagnosis - Goals:     Impression: 54 y/o F with chronic severe and persistent mental illness with thought disorder, limited insight, history of delusions. Medications have been partially helpful.    Treatment Goals:  Specify outcomes written in observable, behavioral terms:   Prevent recurrences.     Treatment Plan/Recommendations:   Risperidone in place of abilify (not covered).   Discussed risks, benefits, and alternatives to treatment plan documented above with patient. I answered all patient questions related to this plan and patient expressed understanding and agreement.     Return to Clinic: 4 months    C. Eugene Anthony MD  Psychiatry  [facility]  [address] , [place] [address] [phone]

## 2022-12-05 ENCOUNTER — TELEPHONE (OUTPATIENT)
Dept: PSYCHIATRY | Facility: CLINIC | Age: 57
End: 2022-12-05

## 2023-03-01 ENCOUNTER — TELEPHONE (OUTPATIENT)
Dept: PSYCHIATRY | Facility: CLINIC | Age: 58
End: 2023-03-01
Payer: MEDICAID

## 2023-03-03 ENCOUNTER — TELEPHONE (OUTPATIENT)
Dept: PSYCHIATRY | Facility: CLINIC | Age: 58
End: 2023-03-03
Payer: MEDICAID

## 2023-03-03 NOTE — TELEPHONE ENCOUNTER
----- Message from Rosario Campos sent at 3/3/2023  3:09 PM CST -----  Type: Patient Call Back    Who called:pt     What is the request in detail:pt has appt today. Would like to reschedule. Call pt     Can the clinic reply by [facility]?    Would the patient rather a call back or a response via [facility]? call    Best call back number:[phone] (home)       Additional Information:

## 2023-03-30 ENCOUNTER — TELEPHONE (OUTPATIENT)
Dept: PSYCHIATRY | Facility: CLINIC | Age: 58
End: 2023-03-30
Payer: MEDICAID

## 2023-04-03 ENCOUNTER — OFFICE VISIT (OUTPATIENT)
Dept: PSYCHIATRY | Facility: CLINIC | Age: 58
End: 2023-04-03
Payer: MEDICAID

## 2023-04-03 DIAGNOSIS — F20.9 SCHIZOPHRENIA, UNSPECIFIED TYPE: Primary | ICD-10-CM

## 2023-04-03 PROCEDURE — 99214 PR OFFICE/OUTPT VISIT, EST, LEVL IV, 30-39 MIN: ICD-10-PCS | Mod: S$PBB,AF,HB, | Performed by: PSYCHIATRY & NEUROLOGY

## 2023-04-03 PROCEDURE — 99214 OFFICE O/P EST MOD 30 MIN: CPT | Mod: S$PBB,AF,HB, | Performed by: PSYCHIATRY & NEUROLOGY

## 2023-04-03 RX ORDER — BENZTROPINE MESYLATE 1 MG/1
TABLET ORAL
Qty: 60 TABLET | Refills: 2 | Status: SHIPPED | OUTPATIENT
Start: 2023-04-03

## 2023-04-03 RX ORDER — RISPERIDONE 2 MG/1
TABLET ORAL
Qty: 30 TABLET | Refills: 3 | Status: SHIPPED | OUTPATIENT
Start: 2023-04-03 | End: 2023-04-04

## 2023-04-03 NOTE — PROGRESS NOTES
"Outpatient Psychiatry Follow-up Visit (MD/NP)    4/3/2023    Sera Whelan, a 57 y.o. female, presenting for follow-up visit. Met with patient and sister.     Reason for Encounter: follow-up schizophrenia.    Interval Hx: Patient seen and interviewed for follow-up, last seen about four months ago. The report epilepsy and HTN controlled. Sleep and appetite good.   Moods are good. Low activity level. Considerable thought disorder, alogia. "Bored". Odd ideas. No aggressive behaviors or agitation. No new medications.   No irritability, No new illnesses. Family assists with all IADL's. She does most ADL's herself, assisted into/out of tub/shower, helped sometimes with dressing.   Adherent to medication. Denies side effects.     Background: Pt is a 55 year old woman who presents for establishment of care. From ED note:     PMH of HTN, seizures, CVA, & schizophrenia who presents to the ED on an OPC secondary to bizarre behavior. OPC states pt has been spraying cleaning products on her bed, pouring food/text into fridge, & cursing/yelling for the reason. Pt is uncooperative with exam & only answers questions intermittently. She reports compliance with her home meds & denies SI, HI, AH, VH, chest pain, shortness of breath, abdominal pain, nausea, vomiting, diarrhea, fever, headache, neck pain, & all other physical complaints at this time. Additional history is unobtainable secondary to psychiatric disorder & pt cooperation.     UDS - ; no psych hospitalization.     Meds: haldol, keppra    Patient and family are generally poor historians but roughly confirm the above history and they report patient has history of schizophrenia, off medications for several years. Last care was about 3 years ago. Sleeps ok. Good appetite for food. Quite low functioning, requires considerable support from adult son, other family.     Psych Hx: [facility] - last 4-5 years ago - kept 8 months to 1 year. "getting up in the middle of " "the night, pouring water on other people, screaming at us". 3-4 hospitalizations. Similar to previous episodes. First hospitalization at age 17. Persistent delusions since about 43 years old. Yelling, slamming doors. Doesn't throw things or hit people. Hears mother & father in her ears. Denies avh. Some paranoia. No evident angela.     Typically takes prescribed medication. Doesn't know what previous clinic she went to.     Medical Hx: seizure disorder controlled with medication. Last 2 years ago. HTN. Borderline DM.     Family Hx:     Social Hx: From [place]. Grew up with both parents in the home. 4 living sisters, 1 . Graduated HS. Started working at     [employer] at [employer] x 14 years. Stopped working in . Lost job, mental health problems prevented further employment. Doesn't have a recognized disability. Father supports her. Brother is unemployed.     Lives with  and son. Says that she lives with her parents (they're ).  >35 years. Has 3 kids - son, 2 daughters. They both live in . Claims to be  from  but son says this isn't true. She also believes a sister who is  is still living.     Doesn't do cleaning, cooking, laundry. She bathes herself with assistance with son. He adminstration her medication.     Review Of Systems:     GENERAL:  No weight gain or loss  SKIN:  No rashes or lacerations  HEAD:  No headaches  CHEST:  No shortness of breath, hyperventilation or cough  CARDIOVASCULAR:  No tachycardia or chest pain  ABDOMEN:  No nausea, vomiting, pain, constipation or diarrhea  URINARY:  No frequency, dysuria or sexual dysfunction  ENDOCRINE:  No polydipsia, polyuria  MUSCULOSKELETAL:  No pain or stiffness of the joints  NEUROLOGIC:  No weakness, sensory changes, seizures, confusion, memory loss, tremor or other abnormal movements    Current Evaluation:     Nutritional Screening: Considering the patient's height and weight, medications, medical " history and preferences, should a referral be made to the dietitian? no    Constitutional  Vitals:  Most recent vital signs, dated less than 90 days prior to this appointment, were not reviewed.    There were no vitals filed for this visit.     General:  unremarkable, age appropriate     Musculoskeletal  Muscle Strength/Tone:  no tremor, no tic   Gait & Station:  non-ataxic     Psychiatric  Appearance: casually dressed & groomed;   Behavior: calm,   Cooperation: cooperative with assessment  Speech: decreased production  Thought Process: concrete  Thought Content: oddness, some delusions  Affect:  blunted  Mood: euthymic  Perceptions: No auditory or visual hallucinations  Level of Consciousness: alert throughout interview  Insight: limited   Cognition: Oriented to person, place, time, & situation  Memory: no apparent deficits to general clinical interview; not formally assessed  Attention/Concentration: no apparent deficits to general clinical interview; not formally assessed  Fund of Knowledge: average by vocabulary/education    Laboratory Data  No visits with results within 1 Month(s) from this visit.   Latest known visit with results is:   Lab Visit on 03/19/2022   Component Date Value Ref Range Status    Cholesterol 03/19/2022 179  120 - 199 mg/dL Final    Triglycerides 03/19/2022 184 (H)  30 - 150 mg/dL Final    HDL 03/19/2022 36 (L)  40 - 75 mg/dL Final    LDL Cholesterol 03/19/2022 106.2  63.0 - 159.0 mg/dL Final    HDL/Cholesterol Ratio 03/19/2022 20.1  20.0 - 50.0 % Final    Total Cholesterol/HDL Ratio 03/19/2022 5.0  2.0 - 5.0 Final    Non-HDL Cholesterol 03/19/2022 143  mg/dL Final     Medications  Outpatient Encounter Medications as of 4/3/2023   Medication Sig Dispense Refill    amLODIPine (NORVASC) 10 MG tablet Take 1 tablet by mouth once daily 90 tablet 0    benztropine (COGENTIN) 1 MG tablet Take 1 mg by mouth.      levETIRAcetam (KEPPRA) 500 MG Tab Take 2 tablets (1,000 mg total) by mouth 2 (two)  times daily. 120 tablet 6    risperiDONE (RISPERDAL) 2 MG tablet Take 1 tablet at bedtime. 30 tablet 3    [DISCONTINUED] amLODIPine (NORVASC) 10 MG tablet Take 1 tablet by mouth once daily 90 tablet 1     No facility-administered encounter medications on file as of 4/3/2023.     Assessment - Diagnosis - Goals:     Impression: 56 y/o F with chronic severe and persistent mental illness with thought disorder, limited insight, history of delusions. Medications have been partially helpful.    Treatment Goals:  Specify outcomes written in observable, behavioral terms:   Prevent recurrences.     Treatment Plan/Recommendations:   Risperidone. Benztropine prn.   Discussed risks, benefits, and alternatives to treatment plan documented above with patient. I answered all patient questions related to this plan and patient expressed understanding and agreement.     Return to Clinic: 4 months    ELLI Anthony MD  Psychiatry  [facility]  [address] , [place] [address]  [phone]

## 2023-04-25 ENCOUNTER — TELEPHONE (OUTPATIENT)
Dept: INTERNAL MEDICINE | Facility: CLINIC | Age: 58
End: 2023-04-25
Payer: MEDICAID

## 2023-04-25 NOTE — TELEPHONE ENCOUNTER
----- Message from Cheryle Schuler sent at 4/25/2023  9:52 AM CDT -----  Regarding: soon appt  Contact: Sera Zamora would like to be seen before the scheduled 06/2023 date.    Sera can be reached at [phone] (dxkg)      Thanks

## 2023-04-26 ENCOUNTER — OFFICE VISIT (OUTPATIENT)
Dept: INTERNAL MEDICINE | Facility: CLINIC | Age: 58
End: 2023-04-26
Payer: MEDICAID

## 2023-04-26 VITALS
WEIGHT: 137.44 LBS | RESPIRATION RATE: 18 BRPM | BODY MASS INDEX: 22.9 KG/M2 | HEIGHT: 65 IN | DIASTOLIC BLOOD PRESSURE: 80 MMHG | TEMPERATURE: 98 F | SYSTOLIC BLOOD PRESSURE: 114 MMHG | OXYGEN SATURATION: 98 % | HEART RATE: 105 BPM

## 2023-04-26 DIAGNOSIS — L84 CALLUS OF FOOT: Primary | ICD-10-CM

## 2023-04-26 PROCEDURE — 99214 OFFICE O/P EST MOD 30 MIN: CPT | Mod: PBBFAC | Performed by: NURSE PRACTITIONER

## 2023-04-26 PROCEDURE — 3074F PR MOST RECENT SYSTOLIC BLOOD PRESSURE < 130 MM HG: ICD-10-PCS | Mod: CPTII,,, | Performed by: NURSE PRACTITIONER

## 2023-04-26 PROCEDURE — 99214 PR OFFICE/OUTPT VISIT, EST, LEVL IV, 30-39 MIN: ICD-10-PCS | Mod: S$PBB,,, | Performed by: NURSE PRACTITIONER

## 2023-04-26 PROCEDURE — 99214 OFFICE O/P EST MOD 30 MIN: CPT | Mod: S$PBB,,, | Performed by: NURSE PRACTITIONER

## 2023-04-26 PROCEDURE — 1159F MED LIST DOCD IN RCRD: CPT | Mod: CPTII,,, | Performed by: NURSE PRACTITIONER

## 2023-04-26 PROCEDURE — 3079F PR MOST RECENT DIASTOLIC BLOOD PRESSURE 80-89 MM HG: ICD-10-PCS | Mod: CPTII,,, | Performed by: NURSE PRACTITIONER

## 2023-04-26 PROCEDURE — 1159F PR MEDICATION LIST DOCUMENTED IN MEDICAL RECORD: ICD-10-PCS | Mod: CPTII,,, | Performed by: NURSE PRACTITIONER

## 2023-04-26 PROCEDURE — 1160F PR REVIEW ALL MEDS BY PRESCRIBER/CLIN PHARMACIST DOCUMENTED: ICD-10-PCS | Mod: CPTII,,, | Performed by: NURSE PRACTITIONER

## 2023-04-26 PROCEDURE — 1160F RVW MEDS BY RX/DR IN RCRD: CPT | Mod: CPTII,,, | Performed by: NURSE PRACTITIONER

## 2023-04-26 PROCEDURE — 3079F DIAST BP 80-89 MM HG: CPT | Mod: CPTII,,, | Performed by: NURSE PRACTITIONER

## 2023-04-26 PROCEDURE — 3074F SYST BP LT 130 MM HG: CPT | Mod: CPTII,,, | Performed by: NURSE PRACTITIONER

## 2023-04-26 PROCEDURE — 3008F PR BODY MASS INDEX (BMI) DOCUMENTED: ICD-10-PCS | Mod: CPTII,,, | Performed by: NURSE PRACTITIONER

## 2023-04-26 PROCEDURE — 3008F BODY MASS INDEX DOCD: CPT | Mod: CPTII,,, | Performed by: NURSE PRACTITIONER

## 2023-04-26 PROCEDURE — 99999 PR PBB SHADOW E&M-EST. PATIENT-LVL IV: CPT | Mod: PBBFAC,,, | Performed by: NURSE PRACTITIONER

## 2023-04-26 PROCEDURE — 99999 PR PBB SHADOW E&M-EST. PATIENT-LVL IV: ICD-10-PCS | Mod: PBBFAC,,, | Performed by: NURSE PRACTITIONER

## 2023-04-26 NOTE — PROGRESS NOTES
Sera Whelan  04/26/2023  [id number]    Rosario Lay DO  Patient Care Team:  Rosario Lay DO as PCP - General (Internal Medicine)  Maggie Iverson LPN as Care Coordinator (Internal Medicine)          Visit Type:an urgent visit for a new problem    Chief Complaint:  Chief Complaint   Patient presents with    Foot Injury       History of Present Illness:    58 yo female presents with co callus to great toe on her left foot. Pt is requesting a podiatry consult to have it removed.      History:  Past Medical History:   Diagnosis Date    Bipolar 1 disorder     Diabetes mellitus     Encounter for blood transfusion     Hypertension     Seizures     Stroke      Past Surgical History:   Procedure Laterality Date    COLONOSCOPY N/A 3/30/2021    Procedure: COLONOSCOPY;  Surgeon: Tani Rust MD;  Location: [facility];  Service: Endoscopy;  Laterality: N/A;     Family History   Problem Relation Age of Onset    Diabetes Mother     Stroke Mother      Social History     Socioeconomic History    Marital status:    Tobacco Use    Smoking status: Never    Smokeless tobacco: Never   Substance and Sexual Activity    Alcohol use: No     Alcohol/week: 0.0 standard drinks    Drug use: No    Sexual activity: Not Currently     Patient Active Problem List   Diagnosis    Seizure    Bipolar 1 disorder, mixed    H/O: stroke with residual effects    Menorrhagia    Essential hypertension    Anemia    Colon cancer screening     Review of patient's allergies indicates:  No Known Allergies    The following were reviewed at this visit: active problem list, medication list, allergies, family history, social history, and health maintenance.    Medications:  Current Outpatient Medications on File Prior to Visit   Medication Sig Dispense Refill    amLODIPine (NORVASC) 10 MG tablet Take 1 tablet by mouth once daily 90 tablet 0    benztropine (COGENTIN) 1 MG tablet Take 1/2 to 1 tablet twice daily as needed. 60 tablet 2     levETIRAcetam (KEPPRA) 500 MG Tab Take 2 tablets (1,000 mg total) by mouth 2 (two) times daily. 120 tablet 6    risperiDONE (RISPERDAL) 2 MG tablet TAKE 1 TABLET BY MOUTH AT BEDTIME 30 tablet 3     No current facility-administered medications on file prior to visit.       Medications have been reviewed and reconciled with patient at this visit.  Barriers to medications reviewed with patient.    Adverse reactions to current medications reviewed with patient..    Over the counter medications reviewed and reconciled with patient.    Exam:  Wt Readings from Last 3 Encounters:   04/26/23 62.4 kg (137 lb 7.3 oz)   06/10/22 62.7 kg (138 lb 3.7 oz)   02/18/22 62.7 kg (138 lb 3.7 oz)     Temp Readings from Last 3 Encounters:   04/26/23 97.9 °F (36.6 °C) (Temporal)   02/18/22 98.9 °F (37.2 °C) (Tympanic)   06/30/21 97.9 °F (36.6 °C) (Oral)     BP Readings from Last 3 Encounters:   04/26/23 114/80   02/18/22 116/72   06/30/21 122/76     Pulse Readings from Last 3 Encounters:   04/26/23 105   02/18/22 100   06/30/21 92     Body mass index is 22.87 kg/m².      Review of Systems   Constitutional:  Negative for fever.   Respiratory:  Negative for cough, shortness of breath and wheezing.    Cardiovascular:  Negative for chest pain and palpitations.   Gastrointestinal:  Negative for nausea.   Neurological:  Negative for speech change, weakness and headaches.   All other systems reviewed and are negative.  Physical Exam  Vitals and nursing note reviewed.   Constitutional:       Appearance: Normal appearance. She is obese.   HENT:      Head: Normocephalic and atraumatic.      Right Ear: Tympanic membrane, ear canal and external ear normal.      Left Ear: Tympanic membrane, ear canal and external ear normal.      Nose: Nose normal.      Mouth/Throat:      Mouth: Mucous membranes are moist.      Pharynx: Oropharynx is clear.   Eyes:      Extraocular Movements: Extraocular movements intact.      Conjunctiva/sclera: Conjunctivae normal.       Pupils: Pupils are equal, round, and reactive to light.   Cardiovascular:      Rate and Rhythm: Normal rate and regular rhythm.      Pulses: Normal pulses.      Heart sounds: Normal heart sounds.   Pulmonary:      Effort: Pulmonary effort is normal.      Breath sounds: Normal breath sounds.   Abdominal:      General: Bowel sounds are normal.      Palpations: Abdomen is soft.   Musculoskeletal:         General: Normal range of motion.      Cervical back: Normal range of motion and neck supple.      Left foot: Bunion present.        Feet:    Skin:     General: Skin is warm and dry.      Capillary Refill: Capillary refill takes less than 2 seconds.   Neurological:      General: No focal deficit present.      Mental Status: She is alert and oriented to person, place, and time.   Psychiatric:         Mood and Affect: Mood normal.         Behavior: Behavior normal.         Thought Content: Thought content normal.         Judgment: Judgment normal.       Laboratory Reviewed ({Yes)  Lab Results   Component Value Date    WBC 4.82 02/19/2022    HGB 12.6 02/19/2022    HCT 41.0 02/19/2022     02/19/2022    CHOL 179 03/19/2022    TRIG 184 (H) 03/19/2022    HDL 36 (L) 03/19/2022    ALT 50 (H) 02/19/2022    AST 35 02/19/2022     02/19/2022    K 3.8 02/19/2022     02/19/2022    CREATININE 0.7 02/19/2022    BUN 9 02/19/2022    CO2 27 02/19/2022    TSH 1.257 02/19/2022    INR 1.0 02/01/2015    HGBA1C 5.9 (H) 02/12/2021       Sera was seen today for foot injury.    Diagnoses and all orders for this visit:    Callus of foot  -     Ambulatory referral/consult to Podiatry; Future          Care Plan/Goals: Reviewed    Goals         Blood Pressure < 140/90 (pt-stated)           Sera was seen today for foot injury.    Diagnoses and all orders for this visit:    Callus of foot  -     Ambulatory referral/consult to Podiatry; Future       Follow up: Follow up with podiatry.    After visit summary was printed and given  to patient upon discharge today.  Patient goals and care plan are included in After Visit Summary.

## 2023-04-27 ENCOUNTER — OFFICE VISIT (OUTPATIENT)
Dept: PODIATRY | Facility: CLINIC | Age: 58
End: 2023-04-27
Payer: MEDICAID

## 2023-04-27 DIAGNOSIS — M21.612 ASYMPTOMATIC BUNION OF LEFT FOOT: ICD-10-CM

## 2023-04-27 DIAGNOSIS — L84 CALLUS OF FOOT: Primary | ICD-10-CM

## 2023-04-27 DIAGNOSIS — Q84.5 ENLARGED AND HYPERTROPHIC NAILS: ICD-10-CM

## 2023-04-27 PROCEDURE — 99999 PR PBB SHADOW E&M-EST. PATIENT-LVL II: CPT | Mod: PBBFAC,,, | Performed by: PODIATRIST

## 2023-04-27 PROCEDURE — 1159F PR MEDICATION LIST DOCUMENTED IN MEDICAL RECORD: ICD-10-PCS | Mod: CPTII,,, | Performed by: PODIATRIST

## 2023-04-27 PROCEDURE — 1160F PR REVIEW ALL MEDS BY PRESCRIBER/CLIN PHARMACIST DOCUMENTED: ICD-10-PCS | Mod: CPTII,,, | Performed by: PODIATRIST

## 2023-04-27 PROCEDURE — 99212 OFFICE O/P EST SF 10 MIN: CPT | Mod: PBBFAC | Performed by: PODIATRIST

## 2023-04-27 PROCEDURE — 1159F MED LIST DOCD IN RCRD: CPT | Mod: CPTII,,, | Performed by: PODIATRIST

## 2023-04-27 PROCEDURE — 1160F RVW MEDS BY RX/DR IN RCRD: CPT | Mod: CPTII,,, | Performed by: PODIATRIST

## 2023-04-27 PROCEDURE — 99203 OFFICE O/P NEW LOW 30 MIN: CPT | Mod: S$PBB,,, | Performed by: PODIATRIST

## 2023-04-27 PROCEDURE — 99203 PR OFFICE/OUTPT VISIT, NEW, LEVL III, 30-44 MIN: ICD-10-PCS | Mod: S$PBB,,, | Performed by: PODIATRIST

## 2023-04-27 PROCEDURE — 99999 PR PBB SHADOW E&M-EST. PATIENT-LVL II: ICD-10-PCS | Mod: PBBFAC,,, | Performed by: PODIATRIST

## 2023-04-27 NOTE — PROGRESS NOTES
Subjective:       Patient ID: Sera Whelan is a 57 y.o. female.    Chief Complaint: Callouses (Patient in to establish care with a complaint of having a callus on her left foot and also needs her nails clipped, patient is not a diabetic and was last seen by pcp on yesterday and she isn't taking any blood thinners.  )      HPI: Patient presents to the office today at the referral of Aarti Heaton NP with complaints of discoloration to the medial left great toe. Reports callus formation present. Unsure how long this has been present. Causing no pain. Patient is non-diabetic and not currently on long term anticoagulation. Presents to clinic with Patient does follow with family member present.  Primary Care for management of comorbid states. This patient's PMD is Rosario Lay DO.     Review of patient's allergies indicates:  No Known Allergies    Past Medical History:   Diagnosis Date    Bipolar 1 disorder     Diabetes mellitus     Encounter for blood transfusion     Hypertension     Seizures     Stroke        Family History   Problem Relation Age of Onset    Diabetes Mother     Stroke Mother        Social History     Socioeconomic History    Marital status:    Tobacco Use    Smoking status: Never    Smokeless tobacco: Never   Substance and Sexual Activity    Alcohol use: No     Alcohol/week: 0.0 standard drinks    Drug use: No    Sexual activity: Not Currently       Past Surgical History:   Procedure Laterality Date    COLONOSCOPY N/A 3/30/2021    Procedure: COLONOSCOPY;  Surgeon: Tani Rust MD;  Location: [facility];  Service: Endoscopy;  Laterality: N/A;       Review of Systems       Objective:   LMP 08/04/2015     Physical Exam  LOWER EXTREMITY PHYSICAL EXAMINATION    VASCULAR:  The right dorsalis pedis pulse 2/4 and the right posterior tibial pulse 2/4.  The left dorsalis pedis pulse 2/4 and posterior tibial pulse on the left is 2/4.  Capillary refill is intact.  Pedal hair growth  intact    DERMATOLOGY:  Callus formation present to the medial aspect of the great toe.  No signs of acute infection.  No signs of underlying ulceration.  No signs of edema or erythema.  Elongated toenails on the right foot and left foot.  No signs of acute infection or signs of paronychia.    ORTHOPEDIC:  Asymptomatic bunion deformity present to the left great toe.  There is decrease in range of motion dorsiflexion and plantar flexion.  Dorsal spurring present.    Assessment:     1. Callus of foot    2. Enlarged and hypertrophic nails    3. Asymptomatic bunion of left foot        Plan:     Callus of foot  -     Ambulatory referral/consult to Podiatry    Enlarged and hypertrophic nails    Asymptomatic bunion of left foot      Thorough discussion is had with the patient today, concerning the diagnosis, its etiology, and the treatment algorithm at present.    Foot counseling and education is provided at this visit. Patient is advised to wear socks and shoes at all times.  Do not walk barefoot, or with just socks, even when indoors.  Be sure to check and inspect the inside of the shoe before putting them on her feet.  Protect your feet at all times.  Walking shoes and/or athletic shoes are the best types of shoe gear. Do not wear vinyl or plastic type shoe gear, as they do not stretch and/or breathe.  Protect your feet from hot and/or cold. Elevate the extremities when sitting.  Do not wear excessively tight socks and/or shoe gear. Wiggle your toes for a few minutes throughout the day. Move your ankles up and down, in and out, to help blood flow in your lower extremity.    Recommend to continue to watch and monitor the bunion deformity.  Would not recommend surgical intervention at this time as patient has asymptomatic bunion deformity         No future appointments.

## 2023-06-03 DIAGNOSIS — G40.909 SEIZURE DISORDER: ICD-10-CM

## 2023-06-05 RX ORDER — LEVETIRACETAM 500 MG/1
1000 TABLET ORAL 2 TIMES DAILY
Qty: 120 TABLET | Refills: 1 | Status: SHIPPED | OUTPATIENT
Start: 2023-06-05 | End: 2023-07-21

## 2023-06-21 DIAGNOSIS — Z12.31 OTHER SCREENING MAMMOGRAM: ICD-10-CM

## 2023-06-23 DIAGNOSIS — I10 ESSENTIAL HYPERTENSION: ICD-10-CM

## 2023-06-23 NOTE — TELEPHONE ENCOUNTER
No care due was identified.  [facility] Embedded Care Due Messages. Reference number: [id number].   6/23/2023 4:09:31 PM CDT

## 2023-06-23 NOTE — TELEPHONE ENCOUNTER
Refill Routing Note   Medication(s) are not appropriate for processing by [facility] for the following reason(s):      Patient not seen by PCP within 15 months:      [facility] action(s):  Defer Care Due:  None identified          Appointments  past 12m or future 3m with PCP    Date Provider   Last Visit   2/18/2022 Rosario Lay DO   Next Visit   Visit date not found Rosario Lay DO   ED visits in past 90 days: 0        Note composed:5:38 PM 06/23/2023

## 2023-06-23 NOTE — TELEPHONE ENCOUNTER
No care due was identified.  [facility] Embedded Care Due Messages. Reference number: [id number].   6/23/2023 4:02:36 PM CDT

## 2023-06-26 ENCOUNTER — PATIENT MESSAGE (OUTPATIENT)
Dept: INTERNAL MEDICINE | Facility: CLINIC | Age: 58
End: 2023-06-26
Payer: MEDICAID

## 2023-06-26 DIAGNOSIS — I10 ESSENTIAL HYPERTENSION: ICD-10-CM

## 2023-06-26 RX ORDER — AMLODIPINE BESYLATE 10 MG/1
10 TABLET ORAL DAILY
Qty: 90 TABLET | Refills: 0 | OUTPATIENT
Start: 2023-06-26

## 2023-06-26 RX ORDER — AMLODIPINE BESYLATE 10 MG/1
10 TABLET ORAL DAILY
Qty: 90 TABLET | Refills: 0 | Status: SHIPPED | OUTPATIENT
Start: 2023-06-26 | End: 2023-07-14 | Stop reason: SDUPTHER

## 2023-06-26 NOTE — TELEPHONE ENCOUNTER
Refill Decision Note   Sera Cleopatra  is requesting a refill authorization.  Brief Assessment and Rationale for Refill:  Quick Discontinue     Medication Therapy Plan:  Pended in another encounter      Comments:     Note composed:11:39 AM 06/26/2023

## 2023-06-26 NOTE — TELEPHONE ENCOUNTER
Refill Decision Note   Sera Cleopatra  is requesting a refill authorization.  Brief Assessment and Rationale for Refill:  Quick Discontinue     Medication Therapy Plan:  Pended in another encounter      Comments:     Note composed:11:49 AM 06/26/2023

## 2023-06-26 NOTE — TELEPHONE ENCOUNTER
Care Due:                  Date            Visit Type   Department     Provider  --------------------------------------------------------------------------------                                EP -                              PRIMARY      [facility] INTERNAL  Last Visit: 02-      CARE ([facility])   MEDICINE       Rosario Lay  Next Visit: None Scheduled  None         None Found                                                            Last  Test          Frequency    Reason                     Performed    Due Date  --------------------------------------------------------------------------------    Office Visit  12 months..  amLODIPine...............  02- 02-    [facility] Embedded Care Due Messages. Reference number: [id number].   6/26/2023 4:57:34 PM CDT

## 2023-06-26 NOTE — TELEPHONE ENCOUNTER
No care due was identified.  [facility] Embedded Care Due Messages. Reference number: [id number].   6/26/2023 11:24:06 AM CDT

## 2023-06-27 ENCOUNTER — PATIENT MESSAGE (OUTPATIENT)
Dept: INTERNAL MEDICINE | Facility: CLINIC | Age: 58
End: 2023-06-27
Payer: MEDICAID

## 2023-06-27 RX ORDER — AMLODIPINE BESYLATE 10 MG/1
10 TABLET ORAL DAILY
Qty: 90 TABLET | Refills: 0 | OUTPATIENT
Start: 2023-06-27

## 2023-06-27 NOTE — TELEPHONE ENCOUNTER
Provider Staff:  Action required for this patient     Please see care gap opportunities below in Care Due Message.    Thanks!  [facility]     Appointments      Date Provider   Last Visit   2/18/2022 Rosario Lay DO   Next Visit   Visit date not found Rosario Lay, DO     Refill Decision Note   Sera Cleopatra  is requesting a refill authorization.  Brief Assessment and Rationale for Refill:  Quick Discontinue     Medication Therapy Plan:         Comments:     Note composed:8:35 AM 06/27/2023

## 2023-07-14 ENCOUNTER — OFFICE VISIT (OUTPATIENT)
Dept: INTERNAL MEDICINE | Facility: CLINIC | Age: 58
End: 2023-07-14
Payer: MEDICAID

## 2023-07-14 VITALS
SYSTOLIC BLOOD PRESSURE: 130 MMHG | TEMPERATURE: 98 F | HEART RATE: 103 BPM | WEIGHT: 141.75 LBS | OXYGEN SATURATION: 100 % | DIASTOLIC BLOOD PRESSURE: 70 MMHG | BODY MASS INDEX: 23.62 KG/M2 | HEIGHT: 65 IN | RESPIRATION RATE: 16 BRPM

## 2023-07-14 DIAGNOSIS — Z00.00 ANNUAL PHYSICAL EXAM: Primary | ICD-10-CM

## 2023-07-14 DIAGNOSIS — I10 ESSENTIAL HYPERTENSION: ICD-10-CM

## 2023-07-14 DIAGNOSIS — D64.9 ANEMIA, UNSPECIFIED TYPE: ICD-10-CM

## 2023-07-14 DIAGNOSIS — F31.60 BIPOLAR 1 DISORDER, MIXED: ICD-10-CM

## 2023-07-14 PROCEDURE — 99999 PR PBB SHADOW E&M-EST. PATIENT-LVL III: ICD-10-PCS | Mod: PBBFAC,,, | Performed by: PHYSICIAN ASSISTANT

## 2023-07-14 PROCEDURE — 3078F DIAST BP <80 MM HG: CPT | Mod: CPTII,,, | Performed by: PHYSICIAN ASSISTANT

## 2023-07-14 PROCEDURE — 1160F PR REVIEW ALL MEDS BY PRESCRIBER/CLIN PHARMACIST DOCUMENTED: ICD-10-PCS | Mod: CPTII,,, | Performed by: PHYSICIAN ASSISTANT

## 2023-07-14 PROCEDURE — 3078F PR MOST RECENT DIASTOLIC BLOOD PRESSURE < 80 MM HG: ICD-10-PCS | Mod: CPTII,,, | Performed by: PHYSICIAN ASSISTANT

## 2023-07-14 PROCEDURE — 99396 PREV VISIT EST AGE 40-64: CPT | Mod: S$PBB,,, | Performed by: PHYSICIAN ASSISTANT

## 2023-07-14 PROCEDURE — 1159F MED LIST DOCD IN RCRD: CPT | Mod: CPTII,,, | Performed by: PHYSICIAN ASSISTANT

## 2023-07-14 PROCEDURE — 99999 PR PBB SHADOW E&M-EST. PATIENT-LVL III: CPT | Mod: PBBFAC,,, | Performed by: PHYSICIAN ASSISTANT

## 2023-07-14 PROCEDURE — 1159F PR MEDICATION LIST DOCUMENTED IN MEDICAL RECORD: ICD-10-PCS | Mod: CPTII,,, | Performed by: PHYSICIAN ASSISTANT

## 2023-07-14 PROCEDURE — 99396 PR PREVENTIVE VISIT,EST,40-64: ICD-10-PCS | Mod: S$PBB,,, | Performed by: PHYSICIAN ASSISTANT

## 2023-07-14 PROCEDURE — 3075F SYST BP GE 130 - 139MM HG: CPT | Mod: CPTII,,, | Performed by: PHYSICIAN ASSISTANT

## 2023-07-14 PROCEDURE — 99213 OFFICE O/P EST LOW 20 MIN: CPT | Mod: PBBFAC | Performed by: PHYSICIAN ASSISTANT

## 2023-07-14 PROCEDURE — 3008F BODY MASS INDEX DOCD: CPT | Mod: CPTII,,, | Performed by: PHYSICIAN ASSISTANT

## 2023-07-14 PROCEDURE — 3008F PR BODY MASS INDEX (BMI) DOCUMENTED: ICD-10-PCS | Mod: CPTII,,, | Performed by: PHYSICIAN ASSISTANT

## 2023-07-14 PROCEDURE — 1160F RVW MEDS BY RX/DR IN RCRD: CPT | Mod: CPTII,,, | Performed by: PHYSICIAN ASSISTANT

## 2023-07-14 PROCEDURE — 3075F PR MOST RECENT SYSTOLIC BLOOD PRESS GE 130-139MM HG: ICD-10-PCS | Mod: CPTII,,, | Performed by: PHYSICIAN ASSISTANT

## 2023-07-14 RX ORDER — AMLODIPINE BESYLATE 10 MG/1
10 TABLET ORAL DAILY
Qty: 90 TABLET | Refills: 1 | Status: SHIPPED | OUTPATIENT
Start: 2023-07-14 | End: 2023-07-21 | Stop reason: SDUPTHER

## 2023-07-14 NOTE — PROGRESS NOTES
"Subjective:      Patient ID: Sera Whelan is a 57 y.o. female.    Chief Complaint: Annual Exam    Patient is known to me, being seen today for annual exam.   Denies concerns/complaints     HTN- amlodipine 10mg     Due for labs     Present with son at today's appt     Last visit April 2023 with Aarti Heaton NP.     Review of Systems   Constitutional:  Negative for chills, diaphoresis and fever.   HENT:  Negative for congestion, rhinorrhea and sore throat.    Respiratory:  Negative for cough, shortness of breath and wheezing.    Gastrointestinal:  Negative for abdominal pain, constipation, diarrhea, nausea and vomiting.   Skin:  Negative for rash.   Neurological:  Negative for dizziness, light-headedness and headaches.     Objective:   /70   Pulse 103   Temp 98.1 °F (36.7 °C)   Resp 16   Ht 5' 5" (1.651 m)   Wt 64.3 kg (141 lb 12.1 oz)   LMP 08/04/2015   SpO2 100%   BMI 23.59 kg/m²   Physical Exam  Constitutional:       General: She is not in acute distress.     Appearance: Normal appearance. She is well-developed. She is not ill-appearing.   HENT:      Head: Normocephalic and atraumatic.      Right Ear: External ear normal.      Left Ear: Hearing, tympanic membrane, ear canal and external ear normal.      Ears:      Comments: Mod-heavy cerumen R ear canal     Nose: Nose normal.      Mouth/Throat:      Mouth: Mucous membranes are moist.      Pharynx: Oropharynx is clear.   Cardiovascular:      Rate and Rhythm: Normal rate and regular rhythm.      Heart sounds: Normal heart sounds. No murmur heard.  Pulmonary:      Effort: Pulmonary effort is normal. No respiratory distress.      Breath sounds: Normal breath sounds. No decreased breath sounds.   Abdominal:      General: Bowel sounds are normal.      Palpations: Abdomen is soft.      Tenderness: There is no abdominal tenderness.   Musculoskeletal:      Right lower leg: No edema.      Left lower leg: No edema.   Skin:     General: Skin is warm " and dry.      Findings: No rash.   Psychiatric:         Speech: Speech normal.         Behavior: Behavior normal.         Thought Content: Thought content normal.     Assessment:      1. Annual physical exam    2. Essential hypertension    3. Anemia, unspecified type    4. Bipolar 1 disorder, mixed       Plan:   Annual physical exam  -     Hemoglobin A1C; Future; Expected date: 07/14/2023  -     TSH; Future; Expected date: 07/14/2023    Essential hypertension  -     CBC Auto Differential; Future; Expected date: 07/14/2023  -     Comprehensive Metabolic Panel; Future; Expected date: 07/14/2023  -     Lipid Panel; Future; Expected date: 07/14/2023  -     amLODIPine (NORVASC) 10 MG tablet; Take 1 tablet (10 mg total) by mouth once daily.  Dispense: 90 tablet; Refill: 1    Anemia, unspecified type  -     CBC Auto Differential; Future; Expected date: 07/14/2023    Bipolar 1 disorder, mixed   Followed by Psych     Mammo to be scheduled     Fasting labs     1yr f/u or sooner if needed

## 2023-07-21 ENCOUNTER — LAB VISIT (OUTPATIENT)
Dept: LAB | Facility: HOSPITAL | Age: 58
End: 2023-07-21
Attending: PHYSICIAN ASSISTANT
Payer: MEDICAID

## 2023-07-21 DIAGNOSIS — I10 ESSENTIAL HYPERTENSION: ICD-10-CM

## 2023-07-21 DIAGNOSIS — D64.9 ANEMIA, UNSPECIFIED TYPE: ICD-10-CM

## 2023-07-21 DIAGNOSIS — Z00.00 ANNUAL PHYSICAL EXAM: ICD-10-CM

## 2023-07-21 DIAGNOSIS — G40.909 SEIZURE DISORDER: ICD-10-CM

## 2023-07-21 LAB
ALBUMIN SERPL BCP-MCNC: 3.3 G/DL (ref 3.5–5.2)
ALP SERPL-CCNC: 136 U/L (ref 55–135)
ALT SERPL W/O P-5'-P-CCNC: 7 U/L (ref 10–44)
ANION GAP SERPL CALC-SCNC: 9 MMOL/L (ref 8–16)
AST SERPL-CCNC: 15 U/L (ref 10–40)
BASOPHILS # BLD AUTO: 0.03 K/UL (ref 0–0.2)
BASOPHILS NFR BLD: 0.5 % (ref 0–1.9)
BILIRUB SERPL-MCNC: 0.3 MG/DL (ref 0.1–1)
BUN SERPL-MCNC: 10 MG/DL (ref 6–20)
CALCIUM SERPL-MCNC: 8.9 MG/DL (ref 8.7–10.5)
CHLORIDE SERPL-SCNC: 107 MMOL/L (ref 95–110)
CHOLEST SERPL-MCNC: 219 MG/DL (ref 120–199)
CHOLEST/HDLC SERPL: 6.4 {RATIO} (ref 2–5)
CO2 SERPL-SCNC: 25 MMOL/L (ref 23–29)
CREAT SERPL-MCNC: 0.9 MG/DL (ref 0.5–1.4)
DIFFERENTIAL METHOD: ABNORMAL
EOSINOPHIL # BLD AUTO: 0.2 K/UL (ref 0–0.5)
EOSINOPHIL NFR BLD: 2.7 % (ref 0–8)
ERYTHROCYTE [DISTWIDTH] IN BLOOD BY AUTOMATED COUNT: 13.6 % (ref 11.5–14.5)
EST. GFR  (NO RACE VARIABLE): >60 ML/MIN/1.73 M^2
ESTIMATED AVG GLUCOSE: 160 MG/DL (ref 68–131)
GLUCOSE SERPL-MCNC: 209 MG/DL (ref 70–110)
HBA1C MFR BLD: 7.2 % (ref 4–5.6)
HCT VFR BLD AUTO: 33.5 % (ref 37–48.5)
HDLC SERPL-MCNC: 34 MG/DL (ref 40–75)
HDLC SERPL: 15.5 % (ref 20–50)
HGB BLD-MCNC: 10.8 G/DL (ref 12–16)
IMM GRANULOCYTES # BLD AUTO: 0.02 K/UL (ref 0–0.04)
IMM GRANULOCYTES NFR BLD AUTO: 0.4 % (ref 0–0.5)
LDLC SERPL CALC-MCNC: 156.6 MG/DL (ref 63–159)
LYMPHOCYTES # BLD AUTO: 1.5 K/UL (ref 1–4.8)
LYMPHOCYTES NFR BLD: 27 % (ref 18–48)
MCH RBC QN AUTO: 29.3 PG (ref 27–31)
MCHC RBC AUTO-ENTMCNC: 32.2 G/DL (ref 32–36)
MCV RBC AUTO: 91 FL (ref 82–98)
MONOCYTES # BLD AUTO: 0.5 K/UL (ref 0.3–1)
MONOCYTES NFR BLD: 9.1 % (ref 4–15)
NEUTROPHILS # BLD AUTO: 3.3 K/UL (ref 1.8–7.7)
NEUTROPHILS NFR BLD: 60.3 % (ref 38–73)
NONHDLC SERPL-MCNC: 185 MG/DL
NRBC BLD-RTO: 0 /100 WBC
PLATELET # BLD AUTO: 310 K/UL (ref 150–450)
PMV BLD AUTO: 10.3 FL (ref 9.2–12.9)
POTASSIUM SERPL-SCNC: 3.5 MMOL/L (ref 3.5–5.1)
PROT SERPL-MCNC: 7.1 G/DL (ref 6–8.4)
RBC # BLD AUTO: 3.68 M/UL (ref 4–5.4)
SODIUM SERPL-SCNC: 141 MMOL/L (ref 136–145)
TRIGL SERPL-MCNC: 142 MG/DL (ref 30–150)
TSH SERPL DL<=0.005 MIU/L-ACNC: 0.73 UIU/ML (ref 0.4–4)
WBC # BLD AUTO: 5.51 K/UL (ref 3.9–12.7)

## 2023-07-21 PROCEDURE — 84443 ASSAY THYROID STIM HORMONE: CPT | Performed by: PHYSICIAN ASSISTANT

## 2023-07-21 PROCEDURE — 36415 COLL VENOUS BLD VENIPUNCTURE: CPT | Performed by: PHYSICIAN ASSISTANT

## 2023-07-21 PROCEDURE — 80053 COMPREHEN METABOLIC PANEL: CPT | Performed by: PHYSICIAN ASSISTANT

## 2023-07-21 PROCEDURE — 83036 HEMOGLOBIN GLYCOSYLATED A1C: CPT | Performed by: PHYSICIAN ASSISTANT

## 2023-07-21 PROCEDURE — 80061 LIPID PANEL: CPT | Performed by: PHYSICIAN ASSISTANT

## 2023-07-21 PROCEDURE — 85025 COMPLETE CBC W/AUTO DIFF WBC: CPT | Performed by: PHYSICIAN ASSISTANT

## 2023-07-21 RX ORDER — LEVETIRACETAM 500 MG/1
1000 TABLET ORAL 2 TIMES DAILY
Qty: 120 TABLET | Refills: 5 | Status: SHIPPED | OUTPATIENT
Start: 2023-07-21

## 2023-07-21 NOTE — TELEPHONE ENCOUNTER
Refill Routing Note   Medication(s) are not appropriate for processing by [facility] for the following reason(s):      Medication outside of protocol    [facility] action(s):  Route Care Due:  None identified              Appointments  past 12m or future 3m with PCP    Date Provider   Last Visit   2/18/2022 Rosario Lay DO   Next Visit   Visit date not found Rosario Lay DO   ED visits in past 90 days: 0        Note composed:10:24 AM 07/21/2023

## 2023-07-21 NOTE — TELEPHONE ENCOUNTER
Refill Routing Note   Medication(s) are not appropriate for processing by [facility] for the following reason(s):      Patient not seen by provider within 15 months    [facility] action(s):  Defer Care Due:  None identified     Medication Therapy Plan: Refill sent to [facility] on 7/14/23. Patient requesting med to be sent to alternate pharmacy. [facility].        Appointments  past 12m or future 3m with PCP    Date Provider   Last Visit   2/18/2022 Rosario Lay DO   Next Visit   Visit date not found Rosario Lay DO   ED visits in past 90 days: 0        Note composed:5:29 PM 07/21/2023

## 2023-07-25 ENCOUNTER — PATIENT MESSAGE (OUTPATIENT)
Dept: INTERNAL MEDICINE | Facility: CLINIC | Age: 58
End: 2023-07-25
Payer: MEDICAID

## 2023-07-25 RX ORDER — AMLODIPINE BESYLATE 10 MG/1
10 TABLET ORAL DAILY
Qty: 90 TABLET | Refills: 3 | Status: SHIPPED | OUTPATIENT
Start: 2023-07-25 | End: 2023-09-15

## 2023-07-28 ENCOUNTER — OFFICE VISIT (OUTPATIENT)
Dept: INTERNAL MEDICINE | Facility: CLINIC | Age: 58
End: 2023-07-28
Payer: MEDICAID

## 2023-07-28 VITALS
WEIGHT: 136.69 LBS | HEART RATE: 107 BPM | DIASTOLIC BLOOD PRESSURE: 80 MMHG | HEIGHT: 65 IN | OXYGEN SATURATION: 97 % | SYSTOLIC BLOOD PRESSURE: 112 MMHG | BODY MASS INDEX: 22.77 KG/M2 | RESPIRATION RATE: 17 BRPM | TEMPERATURE: 98 F

## 2023-07-28 DIAGNOSIS — F31.60 BIPOLAR 1 DISORDER, MIXED: ICD-10-CM

## 2023-07-28 DIAGNOSIS — D64.9 ANEMIA, UNSPECIFIED TYPE: ICD-10-CM

## 2023-07-28 DIAGNOSIS — E11.9 TYPE 2 DIABETES MELLITUS WITHOUT COMPLICATION, WITHOUT LONG-TERM CURRENT USE OF INSULIN: Primary | ICD-10-CM

## 2023-07-28 PROCEDURE — 3051F PR MOST RECENT HEMOGLOBIN A1C LEVEL 7.0 - < 8.0%: ICD-10-PCS | Mod: CPTII,,, | Performed by: PHYSICIAN ASSISTANT

## 2023-07-28 PROCEDURE — 3074F PR MOST RECENT SYSTOLIC BLOOD PRESSURE < 130 MM HG: ICD-10-PCS | Mod: CPTII,,, | Performed by: PHYSICIAN ASSISTANT

## 2023-07-28 PROCEDURE — 3079F DIAST BP 80-89 MM HG: CPT | Mod: CPTII,,, | Performed by: PHYSICIAN ASSISTANT

## 2023-07-28 PROCEDURE — 99999 PR PBB SHADOW E&M-EST. PATIENT-LVL IV: ICD-10-PCS | Mod: PBBFAC,,, | Performed by: PHYSICIAN ASSISTANT

## 2023-07-28 PROCEDURE — 1159F PR MEDICATION LIST DOCUMENTED IN MEDICAL RECORD: ICD-10-PCS | Mod: CPTII,,, | Performed by: PHYSICIAN ASSISTANT

## 2023-07-28 PROCEDURE — 1159F MED LIST DOCD IN RCRD: CPT | Mod: CPTII,,, | Performed by: PHYSICIAN ASSISTANT

## 2023-07-28 PROCEDURE — 3008F BODY MASS INDEX DOCD: CPT | Mod: CPTII,,, | Performed by: PHYSICIAN ASSISTANT

## 2023-07-28 PROCEDURE — 1160F RVW MEDS BY RX/DR IN RCRD: CPT | Mod: CPTII,,, | Performed by: PHYSICIAN ASSISTANT

## 2023-07-28 PROCEDURE — 3008F PR BODY MASS INDEX (BMI) DOCUMENTED: ICD-10-PCS | Mod: CPTII,,, | Performed by: PHYSICIAN ASSISTANT

## 2023-07-28 PROCEDURE — 3051F HG A1C>EQUAL 7.0%<8.0%: CPT | Mod: CPTII,,, | Performed by: PHYSICIAN ASSISTANT

## 2023-07-28 PROCEDURE — 3079F PR MOST RECENT DIASTOLIC BLOOD PRESSURE 80-89 MM HG: ICD-10-PCS | Mod: CPTII,,, | Performed by: PHYSICIAN ASSISTANT

## 2023-07-28 PROCEDURE — 99214 PR OFFICE/OUTPT VISIT, EST, LEVL IV, 30-39 MIN: ICD-10-PCS | Mod: S$PBB,,, | Performed by: PHYSICIAN ASSISTANT

## 2023-07-28 PROCEDURE — 1160F PR REVIEW ALL MEDS BY PRESCRIBER/CLIN PHARMACIST DOCUMENTED: ICD-10-PCS | Mod: CPTII,,, | Performed by: PHYSICIAN ASSISTANT

## 2023-07-28 PROCEDURE — 99999 PR PBB SHADOW E&M-EST. PATIENT-LVL IV: CPT | Mod: PBBFAC,,, | Performed by: PHYSICIAN ASSISTANT

## 2023-07-28 PROCEDURE — 3074F SYST BP LT 130 MM HG: CPT | Mod: CPTII,,, | Performed by: PHYSICIAN ASSISTANT

## 2023-07-28 PROCEDURE — 99214 OFFICE O/P EST MOD 30 MIN: CPT | Mod: PBBFAC | Performed by: PHYSICIAN ASSISTANT

## 2023-07-28 PROCEDURE — 99214 OFFICE O/P EST MOD 30 MIN: CPT | Mod: S$PBB,,, | Performed by: PHYSICIAN ASSISTANT

## 2023-07-28 RX ORDER — METFORMIN HYDROCHLORIDE 500 MG/1
500 TABLET, EXTENDED RELEASE ORAL
Qty: 90 TABLET | Refills: 1 | Status: SHIPPED | OUTPATIENT
Start: 2023-07-28 | End: 2023-11-03 | Stop reason: SDUPTHER

## 2023-07-28 RX ORDER — INSULIN PUMP SYRINGE, 3 ML
EACH MISCELLANEOUS
Qty: 1 EACH | Refills: 0 | Status: SHIPPED | OUTPATIENT
Start: 2023-07-28 | End: 2024-07-27

## 2023-07-28 RX ORDER — LANCETS
EACH MISCELLANEOUS
Qty: 200 EACH | Refills: 1 | Status: SHIPPED | OUTPATIENT
Start: 2023-07-28

## 2023-07-28 NOTE — PROGRESS NOTES
"Subjective:      Patient ID: Sera Whelan is a 57 y.o. female.    Chief Complaint: Lab Results Review    Patient is known to me, being seen today for lab results.    HTN- amlodipine 10mg   DM- A1c 7.2%  Family h/o diabetes  Denies eating sweets often, fruit cocktail and V8 occasionally, water mostly     Recent labs show new onset diabetes, A1c 7.2%, anemia, elevated cholesterol     Present with son at today's appt, live together     Last visit July 2023 with myself for annual.     Review of Systems   Constitutional:  Negative for chills, diaphoresis and fever.   HENT:  Negative for congestion, rhinorrhea and sore throat.    Respiratory:  Negative for cough, shortness of breath and wheezing.    Cardiovascular:  Negative for chest pain and leg swelling.   Gastrointestinal:  Negative for abdominal pain, constipation, diarrhea, nausea and vomiting.   Skin:  Negative for rash.   Neurological:  Negative for dizziness, light-headedness and headaches.     Objective:   /80   Pulse 107   Temp 98.1 °F (36.7 °C)   Resp 17   Ht 5' 5" (1.651 m)   Wt 62 kg (136 lb 11 oz)   LMP 08/04/2015   SpO2 97%   BMI 22.75 kg/m²   Physical Exam  Constitutional:       General: She is not in acute distress.     Appearance: She is well-developed. She is not ill-appearing or diaphoretic.   HENT:      Head: Normocephalic and atraumatic.      Right Ear: External ear normal.      Left Ear: External ear normal.   Eyes:      General: Lids are normal.         Right eye: No discharge.         Left eye: No discharge.      Conjunctiva/sclera: Conjunctivae normal.      Right eye: Right conjunctiva is not injected.      Left eye: Left conjunctiva is not injected.   Pulmonary:      Effort: Pulmonary effort is normal. No respiratory distress.   Skin:     General: Skin is warm and dry.      Findings: No rash.   Neurological:      Mental Status: She is alert and oriented to person, place, and time.   Psychiatric:         Speech: Speech normal. "         Behavior: Behavior normal.         Thought Content: Thought content normal.         Judgment: Judgment normal.     Assessment:      1. Type 2 diabetes mellitus without complication, without long-term current use of insulin    2. Anemia, unspecified type    3. Bipolar 1 disorder, mixed       Plan:   Type 2 diabetes mellitus without complication, without long-term current use of insulin  -     metFORMIN (GLUCOPHAGE-XR) 500 MG ER 24hr tablet; Take 1 tablet (500 mg total) by mouth daily with breakfast.  Dispense: 90 tablet; Refill: 1  -     Comprehensive Metabolic Panel; Future; Expected date: 07/28/2023  -     Hemoglobin A1C; Future; Expected date: 07/28/2023  -     blood-glucose meter kit; To check BG two times daily, to use with insurance preferred meter  Dispense: 1 each; Refill: 0  -     lancets Misc; To check BG two times daily, to use with insurance preferred meter  Dispense: 200 each; Refill: 1  -     blood sugar diagnostic Strp; To check BG two times daily, to use with insurance preferred meter  Dispense: 200 each; Refill: 1    Anemia, unspecified type  -     Vitamin B12; Future; Expected date: 07/28/2023  -     FOLATE; Future; Expected date: 07/28/2023  -     Iron and TIBC; Future; Expected date: 07/28/2023  -     Ferritin; Future; Expected date: 07/28/2023    Bipolar 1 disorder, mixed      Check blood sugars twice daily, once upon waking while fasting and the other 2hrs post-meal  The goal is to have...  Fasting blood sugars ranging from 80-130s   Post-prandial sugars <180    Discussed lifestyle modifications, limit sugar and fat, encourage protein and fiber rich diet   Start metformin once daily     The 10-year ASCVD risk score (Aliyah MIDDLETON, et al., 2019) is: 6.4%    Values used to calculate the score:      Age: 57 years      Sex: Female      Is Non- : Yes      Diabetic: No      Tobacco smoker: No      Systolic Blood Pressure: 112 mmHg      Is BP treated: Yes      HDL  Cholesterol: 34 mg/dL      Total Cholesterol: 219 mg/dL    3mth f/u PCP, fasting labs prior

## 2023-07-28 NOTE — PATIENT INSTRUCTIONS
Check blood sugars twice daily, once upon waking while fasting and the other 2hrs post-meal  The goal is to have...  Fasting blood sugars ranging from 80-130s   Post-prandial sugars <180

## 2023-09-15 DIAGNOSIS — I10 ESSENTIAL HYPERTENSION: ICD-10-CM

## 2023-09-15 RX ORDER — AMLODIPINE BESYLATE 10 MG/1
10 TABLET ORAL
Qty: 90 TABLET | Refills: 0 | Status: SHIPPED | OUTPATIENT
Start: 2023-09-15 | End: 2023-11-03 | Stop reason: SDUPTHER

## 2023-09-15 NOTE — TELEPHONE ENCOUNTER
No care due was identified.  [facility] Embedded Care Due Messages. Reference number: [id number].   9/15/2023 4:08:32 PM CDT

## 2023-09-29 ENCOUNTER — HOSPITAL ENCOUNTER (OUTPATIENT)
Dept: RADIOLOGY | Facility: HOSPITAL | Age: 58
Discharge: HOME | End: 2023-09-29
Attending: INTERNAL MEDICINE
Payer: MEDICAID

## 2023-09-29 DIAGNOSIS — Z12.31 OTHER SCREENING MAMMOGRAM: ICD-10-CM

## 2023-09-29 PROCEDURE — 77067 SCR MAMMO BI INCL CAD: CPT | Mod: TC

## 2023-09-29 PROCEDURE — 77063 BREAST TOMOSYNTHESIS BI: CPT | Mod: 26,,, | Performed by: RADIOLOGY

## 2023-09-29 PROCEDURE — 77063 MAMMO DIGITAL SCREENING BILAT WITH TOMO: ICD-10-PCS | Mod: 26,,, | Performed by: RADIOLOGY

## 2023-09-29 PROCEDURE — 77067 MAMMO DIGITAL SCREENING BILAT WITH TOMO: ICD-10-PCS | Mod: 26,,, | Performed by: RADIOLOGY

## 2023-09-29 PROCEDURE — 77067 SCR MAMMO BI INCL CAD: CPT | Mod: 26,,, | Performed by: RADIOLOGY

## 2023-11-03 ENCOUNTER — OFFICE VISIT (OUTPATIENT)
Dept: INTERNAL MEDICINE | Facility: CLINIC | Age: 58
End: 2023-11-03
Payer: MEDICAID

## 2023-11-03 VITALS
DIASTOLIC BLOOD PRESSURE: 68 MMHG | TEMPERATURE: 98 F | RESPIRATION RATE: 18 BRPM | WEIGHT: 136.44 LBS | HEART RATE: 92 BPM | BODY MASS INDEX: 22.73 KG/M2 | OXYGEN SATURATION: 97 % | HEIGHT: 65 IN | SYSTOLIC BLOOD PRESSURE: 112 MMHG

## 2023-11-03 DIAGNOSIS — E11.9 TYPE 2 DIABETES MELLITUS WITHOUT COMPLICATION, WITHOUT LONG-TERM CURRENT USE OF INSULIN: ICD-10-CM

## 2023-11-03 DIAGNOSIS — D64.9 ANEMIA, UNSPECIFIED TYPE: ICD-10-CM

## 2023-11-03 DIAGNOSIS — I10 ESSENTIAL HYPERTENSION: Primary | ICD-10-CM

## 2023-11-03 PROCEDURE — 1159F PR MEDICATION LIST DOCUMENTED IN MEDICAL RECORD: ICD-10-PCS | Mod: CPTII,,, | Performed by: PHYSICIAN ASSISTANT

## 2023-11-03 PROCEDURE — 3078F PR MOST RECENT DIASTOLIC BLOOD PRESSURE < 80 MM HG: ICD-10-PCS | Mod: CPTII,,, | Performed by: PHYSICIAN ASSISTANT

## 2023-11-03 PROCEDURE — 99999 PR PBB SHADOW E&M-EST. PATIENT-LVL III: ICD-10-PCS | Mod: PBBFAC,,, | Performed by: PHYSICIAN ASSISTANT

## 2023-11-03 PROCEDURE — 3078F DIAST BP <80 MM HG: CPT | Mod: CPTII,,, | Performed by: PHYSICIAN ASSISTANT

## 2023-11-03 PROCEDURE — 99999PBSHW FLU VACCINE (QUAD) GREATER THAN OR EQUAL TO 3YO PRESERVATIVE FREE IM: ICD-10-PCS | Mod: PBBFAC,,,

## 2023-11-03 PROCEDURE — 99214 PR OFFICE/OUTPT VISIT, EST, LEVL IV, 30-39 MIN: ICD-10-PCS | Mod: S$PBB,,, | Performed by: PHYSICIAN ASSISTANT

## 2023-11-03 PROCEDURE — 99999PBSHW FLU VACCINE (QUAD) GREATER THAN OR EQUAL TO 3YO PRESERVATIVE FREE IM: Mod: PBBFAC,,,

## 2023-11-03 PROCEDURE — 3074F SYST BP LT 130 MM HG: CPT | Mod: CPTII,,, | Performed by: PHYSICIAN ASSISTANT

## 2023-11-03 PROCEDURE — 99214 OFFICE O/P EST MOD 30 MIN: CPT | Mod: S$PBB,,, | Performed by: PHYSICIAN ASSISTANT

## 2023-11-03 PROCEDURE — 3074F PR MOST RECENT SYSTOLIC BLOOD PRESSURE < 130 MM HG: ICD-10-PCS | Mod: CPTII,,, | Performed by: PHYSICIAN ASSISTANT

## 2023-11-03 PROCEDURE — 3008F BODY MASS INDEX DOCD: CPT | Mod: CPTII,,, | Performed by: PHYSICIAN ASSISTANT

## 2023-11-03 PROCEDURE — 99999 PR PBB SHADOW E&M-EST. PATIENT-LVL III: CPT | Mod: PBBFAC,,, | Performed by: PHYSICIAN ASSISTANT

## 2023-11-03 PROCEDURE — 1160F RVW MEDS BY RX/DR IN RCRD: CPT | Mod: CPTII,,, | Performed by: PHYSICIAN ASSISTANT

## 2023-11-03 PROCEDURE — 3051F HG A1C>EQUAL 7.0%<8.0%: CPT | Mod: CPTII,,, | Performed by: PHYSICIAN ASSISTANT

## 2023-11-03 PROCEDURE — 99213 OFFICE O/P EST LOW 20 MIN: CPT | Mod: PBBFAC | Performed by: PHYSICIAN ASSISTANT

## 2023-11-03 PROCEDURE — 1160F PR REVIEW ALL MEDS BY PRESCRIBER/CLIN PHARMACIST DOCUMENTED: ICD-10-PCS | Mod: CPTII,,, | Performed by: PHYSICIAN ASSISTANT

## 2023-11-03 PROCEDURE — 3051F PR MOST RECENT HEMOGLOBIN A1C LEVEL 7.0 - < 8.0%: ICD-10-PCS | Mod: CPTII,,, | Performed by: PHYSICIAN ASSISTANT

## 2023-11-03 PROCEDURE — 1159F MED LIST DOCD IN RCRD: CPT | Mod: CPTII,,, | Performed by: PHYSICIAN ASSISTANT

## 2023-11-03 PROCEDURE — 90471 IMMUNIZATION ADMIN: CPT | Mod: PBBFAC

## 2023-11-03 PROCEDURE — 3008F PR BODY MASS INDEX (BMI) DOCUMENTED: ICD-10-PCS | Mod: CPTII,,, | Performed by: PHYSICIAN ASSISTANT

## 2023-11-03 RX ORDER — AMLODIPINE BESYLATE 10 MG/1
10 TABLET ORAL DAILY
Qty: 90 TABLET | Refills: 1 | Status: SHIPPED | OUTPATIENT
Start: 2023-11-03

## 2023-11-03 RX ORDER — METFORMIN HYDROCHLORIDE 500 MG/1
500 TABLET, EXTENDED RELEASE ORAL
Qty: 90 TABLET | Refills: 1 | Status: SHIPPED | OUTPATIENT
Start: 2023-11-03 | End: 2024-11-02

## 2023-11-03 NOTE — PROGRESS NOTES
"Subjective:      Patient ID: Sera Whelan is a 57 y.o. female.    Chief Complaint: Annual Exam  Patient is known to me, being seen today for follow up.     HTN- amlodipine 10mg   DM- A1c 7.2%, metformin 500mg daily   Blood sugar 80s, denies low readings     Due for labs     Present with son today     Last visit July 2023 with myself.   HPI  Review of Systems   Constitutional:  Negative for chills, diaphoresis and fever.   HENT:  Negative for congestion, rhinorrhea and sore throat.    Respiratory:  Negative for cough, shortness of breath and wheezing.    Cardiovascular:  Negative for chest pain and leg swelling.   Gastrointestinal:  Negative for abdominal pain, constipation, diarrhea, nausea and vomiting.   Skin:  Negative for rash.   Neurological:  Negative for dizziness, light-headedness and headaches.       Objective:   /68   Pulse 92   Temp 97.8 °F (36.6 °C) (Tympanic)   Resp 18   Ht 5' 5" (1.651 m)   Wt 61.9 kg (136 lb 7.4 oz)   LMP 08/04/2015   SpO2 97%   BMI 22.71 kg/m²   Physical Exam  Constitutional:       General: She is not in acute distress.     Appearance: Normal appearance. She is well-developed. She is not ill-appearing.   HENT:      Head: Normocephalic and atraumatic.   Cardiovascular:      Rate and Rhythm: Normal rate and regular rhythm.      Heart sounds: Normal heart sounds. No murmur heard.  Pulmonary:      Effort: Pulmonary effort is normal. No respiratory distress.      Breath sounds: Normal breath sounds. No decreased breath sounds.   Musculoskeletal:      Right lower leg: No edema.      Left lower leg: No edema.   Skin:     General: Skin is warm and dry.      Findings: No rash.   Psychiatric:         Speech: Speech normal.         Behavior: Behavior normal.         Thought Content: Thought content normal.       Assessment:      1. Essential hypertension    2. Type 2 diabetes mellitus without complication, without long-term current use of insulin    3. Anemia, unspecified " type       Plan:   Essential hypertension  -     Lipid Panel; Future; Expected date: 11/03/2023  -     amLODIPine (NORVASC) 10 MG tablet; Take 1 tablet (10 mg total) by mouth once daily.  Dispense: 90 tablet; Refill: 1    Type 2 diabetes mellitus without complication, without long-term current use of insulin  -     metFORMIN (GLUCOPHAGE-XR) 500 MG ER 24hr tablet; Take 1 tablet (500 mg total) by mouth daily with breakfast.  Dispense: 90 tablet; Refill: 1    Anemia, unspecified type  -     CBC Auto Differential; Future; Expected date: 11/03/2023      Fasting labs     6mth f/u PCP for annual

## 2023-11-14 ENCOUNTER — PATIENT OUTREACH (OUTPATIENT)
Dept: ADMINISTRATIVE | Facility: HOSPITAL | Age: 58
End: 2023-11-14
Payer: MEDICAID

## 2023-11-14 DIAGNOSIS — E11.9 DIABETES MELLITUS WITHOUT COMPLICATION: Primary | ICD-10-CM

## 2023-11-14 NOTE — PROGRESS NOTES
Working Diabetic Lab Report.    Pt has lab appt scheduled, 11/17/23.  Pt needs micro albumin urine. Mess sent to

## 2023-11-17 ENCOUNTER — LAB VISIT (OUTPATIENT)
Dept: LAB | Facility: HOSPITAL | Age: 58
End: 2023-11-17
Attending: PHYSICIAN ASSISTANT
Payer: MEDICAID

## 2023-11-17 DIAGNOSIS — D64.9 ANEMIA, UNSPECIFIED TYPE: ICD-10-CM

## 2023-11-17 DIAGNOSIS — E11.9 DIABETES MELLITUS WITHOUT COMPLICATION: ICD-10-CM

## 2023-11-17 DIAGNOSIS — E11.9 TYPE 2 DIABETES MELLITUS WITHOUT COMPLICATION, WITHOUT LONG-TERM CURRENT USE OF INSULIN: ICD-10-CM

## 2023-11-17 DIAGNOSIS — I10 ESSENTIAL HYPERTENSION: ICD-10-CM

## 2023-11-17 LAB
ALBUMIN SERPL BCP-MCNC: 3.7 G/DL (ref 3.5–5.2)
ALBUMIN/CREAT UR: 494.3 UG/MG (ref 0–30)
ALP SERPL-CCNC: 138 U/L (ref 55–135)
ALT SERPL W/O P-5'-P-CCNC: 11 U/L (ref 10–44)
ANION GAP SERPL CALC-SCNC: 11 MMOL/L (ref 8–16)
AST SERPL-CCNC: 16 U/L (ref 10–40)
BASOPHILS # BLD AUTO: 0.04 K/UL (ref 0–0.2)
BASOPHILS NFR BLD: 0.8 % (ref 0–1.9)
BILIRUB SERPL-MCNC: 0.5 MG/DL (ref 0.1–1)
BUN SERPL-MCNC: 8 MG/DL (ref 6–20)
CALCIUM SERPL-MCNC: 9.6 MG/DL (ref 8.7–10.5)
CHLORIDE SERPL-SCNC: 106 MMOL/L (ref 95–110)
CHOLEST SERPL-MCNC: 243 MG/DL (ref 120–199)
CHOLEST/HDLC SERPL: 6.2 {RATIO} (ref 2–5)
CO2 SERPL-SCNC: 25 MMOL/L (ref 23–29)
CREAT SERPL-MCNC: 1 MG/DL (ref 0.5–1.4)
CREAT UR-MCNC: 53 MG/DL (ref 15–325)
DIFFERENTIAL METHOD: NORMAL
EOSINOPHIL # BLD AUTO: 0.1 K/UL (ref 0–0.5)
EOSINOPHIL NFR BLD: 2 % (ref 0–8)
ERYTHROCYTE [DISTWIDTH] IN BLOOD BY AUTOMATED COUNT: 13.3 % (ref 11.5–14.5)
EST. GFR  (NO RACE VARIABLE): >60 ML/MIN/1.73 M^2
ESTIMATED AVG GLUCOSE: 143 MG/DL (ref 68–131)
FERRITIN SERPL-MCNC: 18 NG/ML (ref 20–300)
FOLATE SERPL-MCNC: 10.2 NG/ML (ref 4–24)
GLUCOSE SERPL-MCNC: 146 MG/DL (ref 70–110)
HBA1C MFR BLD: 6.6 % (ref 4–5.6)
HCT VFR BLD AUTO: 37.3 % (ref 37–48.5)
HDLC SERPL-MCNC: 39 MG/DL (ref 40–75)
HDLC SERPL: 16 % (ref 20–50)
HGB BLD-MCNC: 12.1 G/DL (ref 12–16)
IMM GRANULOCYTES # BLD AUTO: 0.01 K/UL (ref 0–0.04)
IMM GRANULOCYTES NFR BLD AUTO: 0.2 % (ref 0–0.5)
IRON SERPL-MCNC: 106 UG/DL (ref 30–160)
LDLC SERPL CALC-MCNC: 173.2 MG/DL (ref 63–159)
LYMPHOCYTES # BLD AUTO: 1.3 K/UL (ref 1–4.8)
LYMPHOCYTES NFR BLD: 26.7 % (ref 18–48)
MCH RBC QN AUTO: 29.9 PG (ref 27–31)
MCHC RBC AUTO-ENTMCNC: 32.4 G/DL (ref 32–36)
MCV RBC AUTO: 92 FL (ref 82–98)
MICROALBUMIN UR DL<=1MG/L-MCNC: 262 UG/ML
MONOCYTES # BLD AUTO: 0.5 K/UL (ref 0.3–1)
MONOCYTES NFR BLD: 10 % (ref 4–15)
NEUTROPHILS # BLD AUTO: 3 K/UL (ref 1.8–7.7)
NEUTROPHILS NFR BLD: 60.3 % (ref 38–73)
NONHDLC SERPL-MCNC: 204 MG/DL
NRBC BLD-RTO: 0 /100 WBC
PLATELET # BLD AUTO: 313 K/UL (ref 150–450)
PMV BLD AUTO: 9.9 FL (ref 9.2–12.9)
POTASSIUM SERPL-SCNC: 3.9 MMOL/L (ref 3.5–5.1)
PROT SERPL-MCNC: 8 G/DL (ref 6–8.4)
RBC # BLD AUTO: 4.05 M/UL (ref 4–5.4)
SATURATED IRON: 27 % (ref 20–50)
SODIUM SERPL-SCNC: 142 MMOL/L (ref 136–145)
TOTAL IRON BINDING CAPACITY: 386 UG/DL (ref 250–450)
TRANSFERRIN SERPL-MCNC: 261 MG/DL (ref 200–375)
TRIGL SERPL-MCNC: 154 MG/DL (ref 30–150)
VIT B12 SERPL-MCNC: 639 PG/ML (ref 210–950)
WBC # BLD AUTO: 4.98 K/UL (ref 3.9–12.7)

## 2023-11-17 PROCEDURE — 36415 COLL VENOUS BLD VENIPUNCTURE: CPT | Performed by: PHYSICIAN ASSISTANT

## 2023-11-17 PROCEDURE — 83540 ASSAY OF IRON: CPT | Performed by: PHYSICIAN ASSISTANT

## 2023-11-17 PROCEDURE — 83036 HEMOGLOBIN GLYCOSYLATED A1C: CPT | Performed by: PHYSICIAN ASSISTANT

## 2023-11-17 PROCEDURE — 85025 COMPLETE CBC W/AUTO DIFF WBC: CPT | Performed by: PHYSICIAN ASSISTANT

## 2023-11-17 PROCEDURE — 82728 ASSAY OF FERRITIN: CPT | Performed by: PHYSICIAN ASSISTANT

## 2023-11-17 PROCEDURE — 84466 ASSAY OF TRANSFERRIN: CPT | Performed by: PHYSICIAN ASSISTANT

## 2023-11-17 PROCEDURE — 82607 VITAMIN B-12: CPT | Performed by: PHYSICIAN ASSISTANT

## 2023-11-17 PROCEDURE — 80061 LIPID PANEL: CPT | Performed by: PHYSICIAN ASSISTANT

## 2023-11-17 PROCEDURE — 80053 COMPREHEN METABOLIC PANEL: CPT | Performed by: PHYSICIAN ASSISTANT

## 2023-11-17 PROCEDURE — 82746 ASSAY OF FOLIC ACID SERUM: CPT | Performed by: PHYSICIAN ASSISTANT

## 2023-11-17 PROCEDURE — 82043 UR ALBUMIN QUANTITATIVE: CPT | Performed by: INTERNAL MEDICINE

## 2023-11-20 ENCOUNTER — PATIENT MESSAGE (OUTPATIENT)
Dept: INTERNAL MEDICINE | Facility: CLINIC | Age: 58
End: 2023-11-20
Payer: MEDICAID

## 2023-11-21 DIAGNOSIS — R41.9 DECREASED ALERTNESS: ICD-10-CM

## 2023-11-21 DIAGNOSIS — E11.9 TYPE 2 DIABETES MELLITUS WITHOUT COMPLICATION, WITHOUT LONG-TERM CURRENT USE OF INSULIN: ICD-10-CM

## 2023-11-21 RX ORDER — ATORVASTATIN CALCIUM 40 MG/1
40 TABLET, FILM COATED ORAL DAILY
Qty: 30 TABLET | Refills: 5 | Status: SHIPPED | OUTPATIENT
Start: 2023-11-21 | End: 2024-11-20

## 2023-11-25 ENCOUNTER — TELEPHONE (OUTPATIENT)
Dept: INTERNAL MEDICINE | Facility: CLINIC | Age: 58
End: 2023-11-25
Payer: MEDICAID

## 2023-11-25 DIAGNOSIS — E11.21 CONTROLLED TYPE 2 DIABETES MELLITUS WITH PROTEINURIC DIABETIC NEPHROPATHY: Primary | ICD-10-CM

## 2024-05-03 ENCOUNTER — OFFICE VISIT (OUTPATIENT)
Dept: INTERNAL MEDICINE | Facility: CLINIC | Age: 59
End: 2024-05-03
Payer: MEDICAID

## 2024-05-03 VITALS
DIASTOLIC BLOOD PRESSURE: 88 MMHG | WEIGHT: 134.69 LBS | HEART RATE: 58 BPM | OXYGEN SATURATION: 100 % | BODY MASS INDEX: 22.44 KG/M2 | HEIGHT: 65 IN | SYSTOLIC BLOOD PRESSURE: 138 MMHG | TEMPERATURE: 98 F | RESPIRATION RATE: 20 BRPM

## 2024-05-03 DIAGNOSIS — E11.21 CONTROLLED TYPE 2 DIABETES MELLITUS WITH PROTEINURIC DIABETIC NEPHROPATHY: ICD-10-CM

## 2024-05-03 DIAGNOSIS — Z23 IMMUNIZATION DUE: ICD-10-CM

## 2024-05-03 DIAGNOSIS — E78.2 MIXED HYPERLIPIDEMIA: ICD-10-CM

## 2024-05-03 DIAGNOSIS — I10 HYPERTENSION GOAL BP (BLOOD PRESSURE) < 130/80: Primary | ICD-10-CM

## 2024-05-03 DIAGNOSIS — R09.89 DECREASED PULSES IN FEET: ICD-10-CM

## 2024-05-03 DIAGNOSIS — G40.909 SEIZURE DISORDER: ICD-10-CM

## 2024-05-03 PROCEDURE — 90677 PCV20 VACCINE IM: CPT | Mod: PBBFAC

## 2024-05-03 PROCEDURE — 3075F SYST BP GE 130 - 139MM HG: CPT | Mod: CPTII,,, | Performed by: INTERNAL MEDICINE

## 2024-05-03 PROCEDURE — 99214 OFFICE O/P EST MOD 30 MIN: CPT | Mod: S$PBB,,, | Performed by: INTERNAL MEDICINE

## 2024-05-03 PROCEDURE — 3079F DIAST BP 80-89 MM HG: CPT | Mod: CPTII,,, | Performed by: INTERNAL MEDICINE

## 2024-05-03 PROCEDURE — 1159F MED LIST DOCD IN RCRD: CPT | Mod: CPTII,,, | Performed by: INTERNAL MEDICINE

## 2024-05-03 PROCEDURE — 99214 OFFICE O/P EST MOD 30 MIN: CPT | Mod: PBBFAC | Performed by: INTERNAL MEDICINE

## 2024-05-03 PROCEDURE — 1160F RVW MEDS BY RX/DR IN RCRD: CPT | Mod: CPTII,,, | Performed by: INTERNAL MEDICINE

## 2024-05-03 PROCEDURE — 99999PBSHW ZOSTER RECOMBINANT VACCINE: Mod: PBBFAC,,,

## 2024-05-03 PROCEDURE — 99999 PR PBB SHADOW E&M-EST. PATIENT-LVL IV: CPT | Mod: PBBFAC,,, | Performed by: INTERNAL MEDICINE

## 2024-05-03 PROCEDURE — 3008F BODY MASS INDEX DOCD: CPT | Mod: CPTII,,, | Performed by: INTERNAL MEDICINE

## 2024-05-03 PROCEDURE — 99999PBSHW PNEUMOCOCCAL CONJUGATE VACCINE 20-VALENT: Mod: PBBFAC,,,

## 2024-05-03 PROCEDURE — 90750 HZV VACC RECOMBINANT IM: CPT | Mod: PBBFAC

## 2024-05-03 RX ORDER — METFORMIN HYDROCHLORIDE 500 MG/1
500 TABLET, EXTENDED RELEASE ORAL
Qty: 90 TABLET | Refills: 1 | Status: SHIPPED | OUTPATIENT
Start: 2024-05-03

## 2024-05-03 RX ORDER — AMLODIPINE BESYLATE 10 MG/1
10 TABLET ORAL DAILY
Qty: 90 TABLET | Refills: 1 | Status: SHIPPED | OUTPATIENT
Start: 2024-05-03

## 2024-05-03 RX ORDER — LEVETIRACETAM 500 MG/1
1000 TABLET ORAL 2 TIMES DAILY
Qty: 360 TABLET | Refills: 1 | Status: SHIPPED | OUTPATIENT
Start: 2024-05-03 | End: 2024-05-13

## 2024-05-03 RX ORDER — ATORVASTATIN CALCIUM 40 MG/1
40 TABLET, FILM COATED ORAL DAILY
Qty: 90 TABLET | Refills: 1 | Status: SHIPPED | OUTPATIENT
Start: 2024-05-03 | End: 2024-05-13

## 2024-05-03 NOTE — PROGRESS NOTES
Sera Whelan  58 y.o. Black or  female  [id number]    Chief Complaint:  Chief Complaint   Patient presents with    Follow-up       History of Present Illness:  Here today with her son.   HTN--stable.   DM--compliant with metformin.   Proteinuria--denies taking NSAID's. Denies symptoms. Not on an ACE inhibitor or an ARB.   HLD--compliant with atorvastatin.   Seizure disorder--compliant with Keppra.   She needs refills on all medications.     Laboratory:  Lab Results   Component Value Date    WBC 4.98 11/17/2023    HGB 12.1 11/17/2023    HCT 37.3 11/17/2023     11/17/2023    CHOL 243 (H) 11/17/2023    TRIG 154 (H) 11/17/2023    HDL 39 (L) 11/17/2023    ALT 11 11/17/2023    AST 16 11/17/2023     11/17/2023    K 3.9 11/17/2023     11/17/2023    CREATININE 1.0 11/17/2023    BUN 8 11/17/2023    CO2 25 11/17/2023    TSH 0.728 07/21/2023    INR 1.0 02/01/2015    HGBA1C 6.6 (H) 11/17/2023     Lab Results   Component Value Date    LDLCALC 173.2 (H) 11/17/2023       History:  Past Medical History:   Diagnosis Date    Bipolar 1 disorder     Diabetes mellitus     Encounter for blood transfusion     Hypertension     Seizures     Stroke        Current Outpatient Medications:     benztropine (COGENTIN) 1 MG tablet, Take 1/2 to 1 tablet twice daily as needed., Disp: 60 tablet, Rfl: 2    blood sugar diagnostic Strp, To check BG two times daily, to use with insurance preferred meter, Disp: 200 each, Rfl: 1    blood-glucose meter kit, To check BG two times daily, to use with insurance preferred meter, Disp: 1 each, Rfl: 0    lancets Misc, To check BG two times daily, to use with insurance preferred meter, Disp: 200 each, Rfl: 1    risperiDONE (RISPERDAL) 2 MG tablet, TAKE 1 TABLET BY MOUTH AT BEDTIME, Disp: 30 tablet, Rfl: 3    amLODIPine (NORVASC) 10 MG tablet, Take 1 tablet (10 mg total) by mouth once daily., Disp: 90 tablet, Rfl: 1    atorvastatin (LIPITOR) 40 MG tablet, Take 1 tablet (40 mg  total) by mouth once daily., Disp: 90 tablet, Rfl: 1    levETIRAcetam (KEPPRA) 500 MG Tab, Take 2 tablets (1,000 mg total) by mouth 2 (two) times daily., Disp: 360 tablet, Rfl: 1    metFORMIN (GLUCOPHAGE-XR) 500 MG ER 24hr tablet, Take 1 tablet (500 mg total) by mouth daily with breakfast., Disp: 90 tablet, Rfl: 1    Allergies:  Review of patient's allergies indicates:  No Known Allergies    Review of Systems   Respiratory:  Negative for shortness of breath.    Cardiovascular:  Negative for chest pain and leg swelling.   Neurological:  Negative for dizziness, tingling, seizures and headaches.       Exam:  Vitals:    05/03/24 0950   BP: 138/88   Pulse: (!) 58   Resp: 20   Temp: 98.1 °F (36.7 °C)     Weight: 61.1 kg (134 lb 11.2 oz)   Body mass index is 22.42 kg/m².      Physical Exam  Vitals reviewed.   Constitutional:       General: She is not in acute distress.     Appearance: She is well-developed. She is not ill-appearing.   Eyes:      General: No scleral icterus.  Cardiovascular:      Rate and Rhythm: Normal rate and regular rhythm.      Pulses:           Dorsalis pedis pulses are 1+ on the right side and 1+ on the left side.      Heart sounds: Normal heart sounds.   Pulmonary:      Effort: Pulmonary effort is normal. No respiratory distress.      Breath sounds: Normal breath sounds.   Musculoskeletal:      Right lower leg: No edema.      Left lower leg: No edema.   Feet:      Right foot:      Protective Sensation: 5 sites tested.  5 sites sensed.      Skin integrity: No ulcer.      Left foot:      Protective Sensation: 5 sites tested.  5 sites sensed.      Skin integrity: Dry skin present. No ulcer.   Skin:     General: Skin is warm and dry.   Neurological:      Mental Status: She is alert and oriented to person, place, and time.   Psychiatric:         Behavior: Behavior normal.       Assessment:  The primary encounter diagnosis was Hypertension goal BP (blood pressure) < 130/80. 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    Plan:  Hypertension goal BP (blood pressure) < 130/80  -     Comprehensive Metabolic Panel; Standing  -     Lipid Panel; Standing  -     refill amLODIPine (NORVASC) 10 MG tablet; Take 1 tablet (10 mg total) by mouth once daily.  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  Dispense: 90 tablet; Refill: 1  -     recommend diabetic eye exam  -     check urine microalbumin/creatinine     Mixed hyperlipidemia  -     Comprehensive Metabolic Panel; Standing  -     Lipid Panel; Standing  -     refill atorvastatin (LIPITOR) 40 MG tablet; Take 1 tablet (40 mg total) by mouth once daily.  Dispense: 90 tablet; Refill: 1    Seizure disorder  -     refill levETIRAcetam (KEPPRA) 500 MG Tab; Take 2 tablets (1,000 mg total) by mouth 2 (two) times daily.  Dispense: 360 tablet; Refill: 1    Decreased pulses in feet  -     Ankle Brachial Indices (FELA); Future    Immunization due  -     (In Office Administered) Pneumococcal Conjugate Vaccine (20 Valent) (IM) (Preferred)  -     (In Office Administered) Zoster Recombinant Vaccine    Schedule labs.

## 2024-05-10 ENCOUNTER — TELEPHONE (OUTPATIENT)
Dept: CARDIOLOGY | Facility: CLINIC | Age: 59
End: 2024-05-10
Payer: MEDICAID

## 2024-05-10 NOTE — TELEPHONE ENCOUNTER
Called and spoke to pt to reschedule her FELA on the same day as her labs (05/17/24). Pt scheduled for next Friday at 8:15am [facility].     ----- Message from Renetta Oquendo sent at 5/10/2024  8:57 AM CDT -----  .Type:  Patient Returning Call    Who Called:.Sera Whelan   Who Left Message for Patient:VALERIE  Does the patient know what this is regarding?:appt on 17th, will be fine she stated  Would the patient rather a call back or a response via [facility]? Call back  Best Call Back Number:.[phone]   Additional Information: she will check my chart for date and time

## 2024-05-10 NOTE — TELEPHONE ENCOUNTER
Called and LVM for pt to return call to the office to be rescheduled.     ----- Message from Darcy Montgomery sent at 5/10/2024  8:07 AM CDT -----  Contact: Sera  Type:  Sooner Apoointment Request    Caller is requesting a sooner appointment. Caller will not accept being placed on the waitlist and is requesting a message be sent to doctor.  Name of Caller:Sera  When is the first available appointment?unknown  Symptoms:Ultrasound/Urine/Lab test  Would the patient rather a call back or a response via [facility]? Call or [facility]  Best Call Back Number:[phone]   Additional Information: Patient request to reschedule imaging and testing for 5/17/24, if possible.   Thank you,  GH

## 2024-05-13 DIAGNOSIS — G40.909 SEIZURE DISORDER: ICD-10-CM

## 2024-05-13 DIAGNOSIS — E78.2 MIXED HYPERLIPIDEMIA: ICD-10-CM

## 2024-05-13 RX ORDER — LEVETIRACETAM 500 MG/1
1000 TABLET ORAL 2 TIMES DAILY
Qty: 360 TABLET | Refills: 3 | Status: SHIPPED | OUTPATIENT
Start: 2024-05-13

## 2024-05-13 RX ORDER — ATORVASTATIN CALCIUM 40 MG/1
40 TABLET, FILM COATED ORAL
Qty: 90 TABLET | Refills: 1 | Status: SHIPPED | OUTPATIENT
Start: 2024-05-13

## 2024-05-13 NOTE — TELEPHONE ENCOUNTER
Refill Decision Note   Sera Cleopatra  is requesting a refill authorization.  Brief Assessment and Rationale for Refill:  Approve     Medication Therapy Plan:  flos; [facility] location      Comments:     Note composed:1:51 PM 05/13/2024

## 2024-05-13 NOTE — TELEPHONE ENCOUNTER
Refill Routing Note   Medication(s) are not appropriate for processing by [facility] for the following reason(s):        Outside of protocol    [facility] action(s):  Route             Appointments  past 12m or future 3m with PCP    Date Provider   Last Visit   5/3/2024 Rosario Lay DO   Next Visit   Visit date not found Rosario Lay DO   ED visits in past 90 days: 0        Note composed:8:49 AM 05/13/2024

## 2024-05-13 NOTE — TELEPHONE ENCOUNTER
Care Due:                  Date            Visit Type   Department     Provider  --------------------------------------------------------------------------------                                EP -                              PRIMARY      [facility] INTERNAL  Last Visit: 05-      CARE ([facility])   MEDICINE       Rosario Lay  Next Visit: None Scheduled  None         None Found                                                            Last  Test          Frequency    Reason                     Performed    Due Date  --------------------------------------------------------------------------------    HBA1C.......  6 months...  metFORMIN................  11- 05-    [facility] Embedded Care Due Messages. Reference number: [id number].   5/13/2024 5:54:34 AM CDT

## 2024-05-17 ENCOUNTER — HOSPITAL ENCOUNTER (OUTPATIENT)
Dept: CARDIOLOGY | Facility: HOSPITAL | Age: 59
Discharge: HOME | End: 2024-05-17
Attending: INTERNAL MEDICINE
Payer: MEDICAID

## 2024-05-17 VITALS
WEIGHT: 134 LBS | BODY MASS INDEX: 22.33 KG/M2 | HEIGHT: 65 IN | DIASTOLIC BLOOD PRESSURE: 78 MMHG | SYSTOLIC BLOOD PRESSURE: 144 MMHG

## 2024-05-17 DIAGNOSIS — R09.89 DECREASED PULSES IN FEET: ICD-10-CM

## 2024-05-17 PROCEDURE — 93922 UPR/L XTREMITY ART 2 LEVELS: CPT | Mod: 26,,, | Performed by: INTERNAL MEDICINE

## 2024-05-17 PROCEDURE — 93922 UPR/L XTREMITY ART 2 LEVELS: CPT

## 2024-05-18 LAB
LEFT ABI: 1.27
LEFT ARM BP: 149 MMHG
LEFT DORSALIS PEDIS: 189 MMHG
LEFT POSTERIOR TIBIAL: 185 MMHG
LEFT TBI: 0.81
LEFT TOE PRESSURE: 120 MMHG
RIGHT ABI: 1.21
RIGHT ARM BP: 144 MMHG
RIGHT DORSALIS PEDIS: 168 MMHG
RIGHT POSTERIOR TIBIAL: 180 MMHG
RIGHT TBI: 0.97
RIGHT TOE PRESSURE: 145 MMHG

## 2024-05-21 ENCOUNTER — TELEPHONE (OUTPATIENT)
Dept: INTERNAL MEDICINE | Facility: CLINIC | Age: 59
End: 2024-05-21
Payer: MEDICAID

## 2024-05-21 DIAGNOSIS — E11.29 CONTROLLED TYPE 2 DIABETES MELLITUS WITH MICROALBUMINURIA, WITHOUT LONG-TERM CURRENT USE OF INSULIN: ICD-10-CM

## 2024-05-21 DIAGNOSIS — I10 HYPERTENSION GOAL BP (BLOOD PRESSURE) < 130/80: Primary | ICD-10-CM

## 2024-05-21 DIAGNOSIS — R80.9 CONTROLLED TYPE 2 DIABETES MELLITUS WITH MICROALBUMINURIA, WITHOUT LONG-TERM CURRENT USE OF INSULIN: ICD-10-CM

## 2024-05-21 RX ORDER — LOSARTAN POTASSIUM 25 MG/1
25 TABLET ORAL DAILY
Qty: 30 TABLET | Refills: 5 | Status: SHIPPED | OUTPATIENT
Start: 2024-05-21

## 2024-05-27 ENCOUNTER — PATIENT MESSAGE (OUTPATIENT)
Dept: INTERNAL MEDICINE | Facility: CLINIC | Age: 59
End: 2024-05-27
Payer: MEDICAID

## 2024-07-05 ENCOUNTER — CLINICAL SUPPORT (OUTPATIENT)
Dept: INTERNAL MEDICINE | Facility: CLINIC | Age: 59
End: 2024-07-05
Payer: MEDICAID

## 2024-07-05 DIAGNOSIS — Z23 IMMUNIZATION DUE: ICD-10-CM

## 2024-07-05 DIAGNOSIS — Z23 NEED FOR SHINGLES VACCINE: Primary | ICD-10-CM

## 2024-07-05 PROCEDURE — 99999 PR PBB SHADOW E&M-EST. PATIENT-LVL II: CPT | Mod: PBBFAC,,,

## 2024-07-05 PROCEDURE — 99212 OFFICE O/P EST SF 10 MIN: CPT | Mod: PBBFAC

## 2024-07-05 NOTE — PROGRESS NOTES
Patient is here for a nurse visit for blood pressure.  Patient used 2 identifiers (first&last name spelled and ).  Patient received injection in left deltoid.

## 2024-10-13 DIAGNOSIS — G40.909 SEIZURE DISORDER: ICD-10-CM

## 2024-10-14 NOTE — TELEPHONE ENCOUNTER
Refill Routing Note   Medication(s) are not appropriate for processing by [facility] for the following reason(s):        Outside of protocol    [facility] action(s):  Route               Appointments  past 12m or future 3m with PCP    Date Provider   Last Visit   5/3/2024 Rosario Lay DO   Next Visit   Visit date not found Rosario Lay DO   ED visits in past 90 days: 0        Note composed:8:32 AM 10/14/2024

## 2024-10-15 RX ORDER — LEVETIRACETAM 500 MG/1
1000 TABLET ORAL 2 TIMES DAILY
Qty: 360 TABLET | Refills: 1 | Status: SHIPPED | OUTPATIENT
Start: 2024-10-15

## 2024-10-28 DIAGNOSIS — E78.2 MIXED HYPERLIPIDEMIA: ICD-10-CM

## 2024-10-28 RX ORDER — ATORVASTATIN CALCIUM 40 MG/1
40 TABLET, FILM COATED ORAL
Qty: 90 TABLET | Refills: 1 | Status: SHIPPED | OUTPATIENT
Start: 2024-10-28

## 2024-11-13 DIAGNOSIS — I10 HYPERTENSION GOAL BP (BLOOD PRESSURE) < 130/80: ICD-10-CM

## 2024-11-13 DIAGNOSIS — E11.29 CONTROLLED TYPE 2 DIABETES MELLITUS WITH MICROALBUMINURIA, WITHOUT LONG-TERM CURRENT USE OF INSULIN: ICD-10-CM

## 2024-11-13 DIAGNOSIS — R80.9 CONTROLLED TYPE 2 DIABETES MELLITUS WITH MICROALBUMINURIA, WITHOUT LONG-TERM CURRENT USE OF INSULIN: ICD-10-CM

## 2024-11-13 NOTE — TELEPHONE ENCOUNTER
No care due was identified.  [facility] Embedded Care Due Messages. Reference number: [id number].   11/13/2024 6:06:08 AM CST

## 2024-11-13 NOTE — TELEPHONE ENCOUNTER
Refill Routing Note   Medication(s) are not appropriate for processing by [facility] for the following reason(s):        Required vitals abnormal    [facility] action(s):  Defer               Appointments  past 12m or future 3m with PCP    Date Provider   Last Visit   5/3/2024 Rosario Lay DO   Next Visit   Visit date not found Rosario Lay DO   ED visits in past 90 days: 0        Note composed:2:36 PM 11/13/2024

## 2024-11-14 RX ORDER — LOSARTAN POTASSIUM 25 MG/1
25 TABLET ORAL
Qty: 90 TABLET | Refills: 0 | Status: SHIPPED | OUTPATIENT
Start: 2024-11-14

## 2024-11-26 ENCOUNTER — PATIENT OUTREACH (OUTPATIENT)
Dept: ADMINISTRATIVE | Facility: HOSPITAL | Age: 59
End: 2024-11-26
Payer: MEDICAID

## 2024-12-03 ENCOUNTER — PATIENT OUTREACH (OUTPATIENT)
Dept: ADMINISTRATIVE | Facility: HOSPITAL | Age: 59
End: 2024-12-03
Payer: MEDICAID

## 2024-12-06 ENCOUNTER — LAB VISIT (OUTPATIENT)
Dept: LAB | Facility: HOSPITAL | Age: 59
End: 2024-12-06
Attending: INTERNAL MEDICINE
Payer: MEDICAID

## 2024-12-06 DIAGNOSIS — E11.21 CONTROLLED TYPE 2 DIABETES MELLITUS WITH PROTEINURIC DIABETIC NEPHROPATHY: ICD-10-CM

## 2024-12-06 LAB
ESTIMATED AVG GLUCOSE: 131 MG/DL (ref 68–131)
HBA1C MFR BLD: 6.2 % (ref 4–5.6)

## 2024-12-06 PROCEDURE — 83036 HEMOGLOBIN GLYCOSYLATED A1C: CPT | Performed by: INTERNAL MEDICINE

## 2024-12-06 PROCEDURE — 36415 COLL VENOUS BLD VENIPUNCTURE: CPT | Performed by: INTERNAL MEDICINE

## 2024-12-10 ENCOUNTER — PATIENT MESSAGE (OUTPATIENT)
Dept: INTERNAL MEDICINE | Facility: CLINIC | Age: 59
End: 2024-12-10
Payer: MEDICAID

## 2024-12-10 ENCOUNTER — TELEPHONE (OUTPATIENT)
Dept: INTERNAL MEDICINE | Facility: CLINIC | Age: 59
End: 2024-12-10
Payer: MEDICAID

## 2024-12-10 NOTE — TELEPHONE ENCOUNTER
----- Message from Tootie sent at 12/9/2024  4:57 PM CST -----  Type:  Needs Medical Advice    Who Called: NICOLE OLIVO [[id number]]  Symptoms (please be specific):    How long has patient had these symptoms:    Pharmacy name and phone #:    Would the patient rather a call back or a response via [facility]?  Call Portal if no answer   Best Call Back Number:  [phone]  Additional Information: Patient called to schedule appt

## 2024-12-10 NOTE — TELEPHONE ENCOUNTER
----- Message from Tootie sent at 12/9/2024  4:57 PM CST -----  Type:  Needs Medical Advice    Who Called: NICOLE OLIVO [[id number]]  Symptoms (please be specific):    How long has patient had these symptoms:    Pharmacy name and phone #:    Would the patient rather a call back or a response via [facility]?  Call Portal if no answer   Best Call Back Number:  [phone]  Additional Information: Patient called to schedule appt

## 2024-12-27 ENCOUNTER — OFFICE VISIT (OUTPATIENT)
Dept: INTERNAL MEDICINE | Facility: CLINIC | Age: 59
End: 2024-12-27
Payer: MEDICAID

## 2024-12-27 VITALS
DIASTOLIC BLOOD PRESSURE: 82 MMHG | BODY MASS INDEX: 21.31 KG/M2 | HEIGHT: 65 IN | HEART RATE: 62 BPM | SYSTOLIC BLOOD PRESSURE: 150 MMHG | OXYGEN SATURATION: 98 % | TEMPERATURE: 99 F | WEIGHT: 127.88 LBS

## 2024-12-27 DIAGNOSIS — G40.909 SEIZURE DISORDER: ICD-10-CM

## 2024-12-27 DIAGNOSIS — I10 HYPERTENSION GOAL BP (BLOOD PRESSURE) < 130/80: Primary | ICD-10-CM

## 2024-12-27 DIAGNOSIS — Z12.31 ENCOUNTER FOR SCREENING MAMMOGRAM FOR MALIGNANT NEOPLASM OF BREAST: ICD-10-CM

## 2024-12-27 DIAGNOSIS — Z23 NEED FOR VACCINATION: ICD-10-CM

## 2024-12-27 DIAGNOSIS — F31.60 BIPOLAR 1 DISORDER, MIXED: ICD-10-CM

## 2024-12-27 DIAGNOSIS — R41.0 CONFUSION: ICD-10-CM

## 2024-12-27 DIAGNOSIS — E11.21 CONTROLLED TYPE 2 DIABETES MELLITUS WITH PROTEINURIC DIABETIC NEPHROPATHY: ICD-10-CM

## 2024-12-27 DIAGNOSIS — E78.2 MIXED HYPERLIPIDEMIA: ICD-10-CM

## 2024-12-27 PROCEDURE — 99999 PR PBB SHADOW E&M-EST. PATIENT-LVL IV: CPT | Mod: PBBFAC,,, | Performed by: PHYSICIAN ASSISTANT

## 2024-12-27 PROCEDURE — 99214 OFFICE O/P EST MOD 30 MIN: CPT | Mod: PBBFAC | Performed by: PHYSICIAN ASSISTANT

## 2024-12-27 RX ORDER — LOSARTAN POTASSIUM 25 MG/1
25 TABLET ORAL DAILY
Qty: 90 TABLET | Refills: 1 | Status: SHIPPED | OUTPATIENT
Start: 2024-12-27

## 2024-12-27 RX ORDER — AMLODIPINE BESYLATE 10 MG/1
10 TABLET ORAL DAILY
Qty: 90 TABLET | Refills: 1 | Status: SHIPPED | OUTPATIENT
Start: 2024-12-27

## 2024-12-27 NOTE — PROGRESS NOTES
"Subjective:      Patient ID: Sera Whelan is a 59 y.o. female.    Chief Complaint: Follow-up    Patient is known to me, being seen today for follow up.      HTN- amlodipine 10mg, losartan 25mg   Does not check BP at home   HLD- ons tatin therapy    DM- A1c 6.2%, metformin 500mg daily   Blood sugar 80s, denies low readings   Bipolar- followed by Psychiatry      A1c completed, improving, due for fasting labs     States she is not taking seizure medication and has not since she was 18yo, active on med list and prescribed by PCP last in October w refills   Denies seizures since college     Son usually present at visits to assist patient but he is not here today       Last visit May 2024 w PCP.       Review of Systems   Constitutional:  Negative for chills, diaphoresis and fever.   HENT:  Negative for congestion, rhinorrhea and sore throat.    Respiratory:  Negative for cough, shortness of breath and wheezing.    Gastrointestinal:  Negative for abdominal pain, constipation, diarrhea, nausea and vomiting.   Skin:  Negative for rash.   Neurological:  Negative for dizziness, light-headedness and headaches.       Objective:   BP (!) 150/82   Pulse 62   Temp 99 °F (37.2 °C) (Tympanic)   Ht 5' 5" (1.651 m)   Wt 58 kg (127 lb 13.9 oz)   LMP 08/04/2015   SpO2 98%   BMI 21.28 kg/m²   Physical Exam  Constitutional:       General: She is not in acute distress.     Appearance: Normal appearance. She is well-developed. She is not ill-appearing.   HENT:      Head: Normocephalic and atraumatic.   Cardiovascular:      Rate and Rhythm: Normal rate and regular rhythm.      Heart sounds: Normal heart sounds. No murmur heard.  Pulmonary:      Effort: Pulmonary effort is normal. No respiratory distress.      Breath sounds: Normal breath sounds. No decreased breath sounds.   Musculoskeletal:      Right lower leg: No edema.      Left lower leg: No edema.   Skin:     General: Skin is warm and dry.      Findings: No rash. "   Psychiatric:         Speech: Speech normal.         Behavior: Behavior normal.         Thought Content: Thought content normal.       Assessment:      1. Hypertension goal BP (blood pressure) < 130/80    2. Mixed hyperlipidemia    3. 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 Encounter for screening mammogram for malignant neoplasm of breast    5. Confusion    6. Seizure disorder    7. Bipolar 1 disorder, mixed    8. Need for vaccination       Plan:   Hypertension goal BP (blood pressure) < 130/80  -     CBC Auto Differential; Future; Expected date: 12/27/2024  -     Comprehensive Metabolic Panel; Future; Expected date: 12/27/2024  -     losartan (COZAAR) 25 MG tablet; Take 1 tablet (25 mg total) by mouth once daily.  Dispense: 90 tablet; Refill: 1  -     amLODIPine (NORVASC) 10 MG tablet; Take 1 tablet (10 mg total) by mouth once daily.  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  Dispense: 90 tablet; Refill: 1    Encounter for screening mammogram for malignant neoplasm of breast  -     Mammo Digital Screening Bilat w/ Lazaro; Future; Expected date: 12/27/2024    Confusion  -     Urinalysis; Future; Expected date: 12/27/2024    Seizure disorder    Bipolar 1 disorder, mixed   Followed by Psych     Need for vaccination  -     Influenza - Trivalent - PF (ADULT)      Fasting labs     Patient seems a bit confused in regards to current medication   1mth f/u BP, bring all medications to visit, encourage patient to bring son next visit     COMFORT for [facility] (2mths)     6mth f/u PCP

## 2025-01-03 ENCOUNTER — LAB VISIT (OUTPATIENT)
Dept: LAB | Facility: HOSPITAL | Age: 60
End: 2025-01-03
Attending: PHYSICIAN ASSISTANT
Payer: MEDICAID

## 2025-01-03 DIAGNOSIS — E78.2 MIXED HYPERLIPIDEMIA: ICD-10-CM

## 2025-01-03 DIAGNOSIS — I10 HYPERTENSION GOAL BP (BLOOD PRESSURE) < 130/80: ICD-10-CM

## 2025-01-03 LAB
ALBUMIN SERPL BCP-MCNC: 3.4 G/DL (ref 3.5–5.2)
ALP SERPL-CCNC: 125 U/L (ref 40–150)
ALT SERPL W/O P-5'-P-CCNC: 15 U/L (ref 10–44)
ANION GAP SERPL CALC-SCNC: 7 MMOL/L (ref 8–16)
AST SERPL-CCNC: 19 U/L (ref 10–40)
BASOPHILS # BLD AUTO: 0.02 K/UL (ref 0–0.2)
BASOPHILS NFR BLD: 0.4 % (ref 0–1.9)
BILIRUB SERPL-MCNC: 0.5 MG/DL (ref 0.1–1)
BUN SERPL-MCNC: 14 MG/DL (ref 8–23)
CALCIUM SERPL-MCNC: 9 MG/DL (ref 8.7–10.5)
CHLORIDE SERPL-SCNC: 108 MMOL/L (ref 95–110)
CHOLEST SERPL-MCNC: 124 MG/DL (ref 120–199)
CHOLEST/HDLC SERPL: 3 {RATIO} (ref 2–5)
CO2 SERPL-SCNC: 28 MMOL/L (ref 23–29)
CREAT SERPL-MCNC: 0.8 MG/DL (ref 0.5–1.4)
DIFFERENTIAL METHOD BLD: ABNORMAL
EOSINOPHIL # BLD AUTO: 0.1 K/UL (ref 0–0.5)
EOSINOPHIL NFR BLD: 2.6 % (ref 0–8)
ERYTHROCYTE [DISTWIDTH] IN BLOOD BY AUTOMATED COUNT: 13.4 % (ref 11.5–14.5)
EST. GFR  (NO RACE VARIABLE): >60 ML/MIN/1.73 M^2
GLUCOSE SERPL-MCNC: 94 MG/DL (ref 70–110)
HCT VFR BLD AUTO: 34.5 % (ref 37–48.5)
HDLC SERPL-MCNC: 41 MG/DL (ref 40–75)
HDLC SERPL: 33.1 % (ref 20–50)
HGB BLD-MCNC: 11.1 G/DL (ref 12–16)
IMM GRANULOCYTES # BLD AUTO: 0.01 K/UL (ref 0–0.04)
IMM GRANULOCYTES NFR BLD AUTO: 0.2 % (ref 0–0.5)
LDLC SERPL CALC-MCNC: 71.4 MG/DL (ref 63–159)
LYMPHOCYTES # BLD AUTO: 1.3 K/UL (ref 1–4.8)
LYMPHOCYTES NFR BLD: 28.7 % (ref 18–48)
MCH RBC QN AUTO: 30.7 PG (ref 27–31)
MCHC RBC AUTO-ENTMCNC: 32.2 G/DL (ref 32–36)
MCV RBC AUTO: 95 FL (ref 82–98)
MONOCYTES # BLD AUTO: 0.5 K/UL (ref 0.3–1)
MONOCYTES NFR BLD: 10.2 % (ref 4–15)
NEUTROPHILS # BLD AUTO: 2.7 K/UL (ref 1.8–7.7)
NEUTROPHILS NFR BLD: 57.9 % (ref 38–73)
NONHDLC SERPL-MCNC: 83 MG/DL
NRBC BLD-RTO: 0 /100 WBC
PLATELET # BLD AUTO: 250 K/UL (ref 150–450)
PMV BLD AUTO: 11.1 FL (ref 9.2–12.9)
POTASSIUM SERPL-SCNC: 3.9 MMOL/L (ref 3.5–5.1)
PROT SERPL-MCNC: 7.4 G/DL (ref 6–8.4)
RBC # BLD AUTO: 3.62 M/UL (ref 4–5.4)
SODIUM SERPL-SCNC: 143 MMOL/L (ref 136–145)
TRIGL SERPL-MCNC: 58 MG/DL (ref 30–150)
WBC # BLD AUTO: 4.63 K/UL (ref 3.9–12.7)

## 2025-01-03 PROCEDURE — 80053 COMPREHEN METABOLIC PANEL: CPT | Performed by: PHYSICIAN ASSISTANT

## 2025-01-03 PROCEDURE — 80061 LIPID PANEL: CPT | Performed by: PHYSICIAN ASSISTANT

## 2025-01-03 PROCEDURE — 36415 COLL VENOUS BLD VENIPUNCTURE: CPT | Performed by: PHYSICIAN ASSISTANT

## 2025-01-03 PROCEDURE — 85025 COMPLETE CBC W/AUTO DIFF WBC: CPT | Performed by: PHYSICIAN ASSISTANT

## 2025-01-16 ENCOUNTER — PATIENT OUTREACH (OUTPATIENT)
Dept: ADMINISTRATIVE | Facility: HOSPITAL | Age: 60
End: 2025-01-16
Payer: MEDICAID

## 2025-01-16 NOTE — PROGRESS NOTES
COMFORT DM EYE EXAM 12/24/2024 uploaded; faxed to [facility] 1x to request. Reminder set.

## 2025-01-30 NOTE — PROGRESS NOTES
2nd attempt  COMFORT DM EYE EXAM 12/24/2024 uploaded; faxed to [facility] 1x to request. Reminder set.

## 2025-02-05 ENCOUNTER — TELEPHONE (OUTPATIENT)
Dept: INTERNAL MEDICINE | Facility: CLINIC | Age: 60
End: 2025-02-05
Payer: MEDICAID

## 2025-02-05 NOTE — TELEPHONE ENCOUNTER
----- Message from Madison sent at 2/5/2025  3:58 PM CST -----  Name of Who is Calling:NICOLE OLIVO [[id number]]        What is the request in detail:Pt requesting a call back to reschedule pt 02/07/2025 appointment to a later date if possible.        Can the clinic reply by [facility]:Yes Please        What Number to Call Back [facility]:Telephone Information:  Mobile          [phone]

## 2025-04-02 ENCOUNTER — PATIENT OUTREACH (OUTPATIENT)
Dept: ADMINISTRATIVE | Facility: HOSPITAL | Age: 60
End: 2025-04-02
Payer: MEDICAID

## 2025-04-10 DIAGNOSIS — G40.909 SEIZURE DISORDER: ICD-10-CM

## 2025-04-10 NOTE — TELEPHONE ENCOUNTER
Refill Routing Note   Medication(s) are not appropriate for processing by [facility] for the following reason(s):        Outside of protocol    [facility] action(s):  Route               Appointments  past 12m or future 3m with PCP    Date Provider   Last Visit   5/3/2024 Rosario Lay DO   Next Visit   7/1/2025 Rosario Lay DO   ED visits in past 90 days: 0        Note composed:8:43 AM 04/10/2025

## 2025-04-14 RX ORDER — LEVETIRACETAM 500 MG/1
1000 TABLET ORAL 2 TIMES DAILY
Qty: 360 TABLET | Refills: 0 | Status: SHIPPED | OUTPATIENT
Start: 2025-04-14

## 2025-05-23 ENCOUNTER — TELEPHONE (OUTPATIENT)
Dept: PSYCHIATRY | Facility: CLINIC | Age: 60
End: 2025-05-23
Payer: MEDICAID

## 2025-05-23 ENCOUNTER — TELEPHONE (OUTPATIENT)
Dept: PSYCHIATRY | Facility: CLINIC | Age: 60
End: 2025-05-23

## 2025-05-23 ENCOUNTER — HOSPITAL ENCOUNTER (OUTPATIENT)
Dept: RADIOLOGY | Facility: HOSPITAL | Age: 60
Discharge: HOME | End: 2025-05-23
Attending: PHYSICIAN ASSISTANT
Payer: MEDICAID

## 2025-05-23 DIAGNOSIS — Z12.31 ENCOUNTER FOR SCREENING MAMMOGRAM FOR MALIGNANT NEOPLASM OF BREAST: ICD-10-CM

## 2025-05-23 PROCEDURE — 77067 SCR MAMMO BI INCL CAD: CPT | Mod: 26,,, | Performed by: RADIOLOGY

## 2025-05-23 PROCEDURE — 77063 BREAST TOMOSYNTHESIS BI: CPT | Mod: TC

## 2025-05-23 PROCEDURE — 77063 BREAST TOMOSYNTHESIS BI: CPT | Mod: 26,,, | Performed by: RADIOLOGY

## 2025-05-23 NOTE — TELEPHONE ENCOUNTER
Copied from CRM #[id number]. Topic: Appointments - Appointment Access  >> May 23, 2025  8:04 AM Luis wrote:  .Type: Appt access     Who called:      patient  What is the request in detail:    Called in needing to reschedule appt for today . Please reach out through portal   Can the clinic reply by [facility]?           Would the patient rather a call back or a response via [facility]?   [facility]      Best call back number:

## 2025-05-26 ENCOUNTER — RESULTS FOLLOW-UP (OUTPATIENT)
Dept: INTERNAL MEDICINE | Facility: CLINIC | Age: 60
End: 2025-05-26

## 2025-06-19 ENCOUNTER — TELEPHONE (OUTPATIENT)
Dept: PSYCHIATRY | Facility: CLINIC | Age: 60
End: 2025-06-19
Payer: MEDICAID

## 2025-06-19 NOTE — TELEPHONE ENCOUNTER
Copied from CRM #[id number]. Topic: Appointments - Appointment Rescheduling  >> Jun 19, 2025  3:05 PM Yuridia wrote:  .Patient is calling to speak with the nurse regarding appt . Reports wanting to reschedule appt . Please give patient a call back at .[phone]

## 2025-06-26 RX ORDER — LOSARTAN POTASSIUM 50 MG/1
50 TABLET ORAL
COMMUNITY
Start: 2025-05-12 | End: 2025-06-27 | Stop reason: SDUPTHER

## 2025-06-26 RX ORDER — HALOPERIDOL 10 MG/1
10 TABLET ORAL NIGHTLY
COMMUNITY
Start: 2025-05-12

## 2025-06-26 RX ORDER — HYDROCHLOROTHIAZIDE 12.5 MG/1
12.5 TABLET ORAL
COMMUNITY
Start: 2025-05-12 | End: 2025-06-27 | Stop reason: SDUPTHER

## 2025-06-27 ENCOUNTER — OFFICE VISIT (OUTPATIENT)
Dept: INTERNAL MEDICINE | Facility: CLINIC | Age: 60
End: 2025-06-27
Payer: MEDICAID

## 2025-06-27 VITALS
HEIGHT: 65 IN | TEMPERATURE: 97 F | BODY MASS INDEX: 21.6 KG/M2 | DIASTOLIC BLOOD PRESSURE: 92 MMHG | OXYGEN SATURATION: 99 % | SYSTOLIC BLOOD PRESSURE: 132 MMHG | WEIGHT: 129.63 LBS | HEART RATE: 88 BPM

## 2025-06-27 DIAGNOSIS — Z00.00 ANNUAL PHYSICAL EXAM: Primary | ICD-10-CM

## 2025-06-27 DIAGNOSIS — F20.9 SCHIZOPHRENIA, UNSPECIFIED TYPE: ICD-10-CM

## 2025-06-27 DIAGNOSIS — E11.21 CONTROLLED TYPE 2 DIABETES MELLITUS WITH PROTEINURIC DIABETIC NEPHROPATHY: ICD-10-CM

## 2025-06-27 DIAGNOSIS — I10 HYPERTENSION GOAL BP (BLOOD PRESSURE) < 130/80: ICD-10-CM

## 2025-06-27 DIAGNOSIS — E78.2 MIXED HYPERLIPIDEMIA: ICD-10-CM

## 2025-06-27 PROCEDURE — 99214 OFFICE O/P EST MOD 30 MIN: CPT | Mod: PBBFAC | Performed by: INTERNAL MEDICINE

## 2025-06-27 PROCEDURE — 99999 PR PBB SHADOW E&M-EST. PATIENT-LVL IV: CPT | Mod: PBBFAC,,, | Performed by: INTERNAL MEDICINE

## 2025-06-27 RX ORDER — ATORVASTATIN CALCIUM 40 MG/1
40 TABLET, FILM COATED ORAL NIGHTLY
Qty: 90 TABLET | Refills: 3 | Status: SHIPPED | OUTPATIENT
Start: 2025-06-27

## 2025-06-27 RX ORDER — HYDROCHLOROTHIAZIDE 12.5 MG/1
12.5 TABLET ORAL DAILY
Qty: 90 TABLET | Refills: 3 | Status: SHIPPED | OUTPATIENT
Start: 2025-06-27

## 2025-06-27 RX ORDER — METFORMIN HYDROCHLORIDE 500 MG/1
500 TABLET, EXTENDED RELEASE ORAL
Qty: 90 TABLET | Refills: 3 | Status: SHIPPED | OUTPATIENT
Start: 2025-06-27

## 2025-06-27 RX ORDER — LOSARTAN POTASSIUM 50 MG/1
50 TABLET ORAL DAILY
Qty: 90 TABLET | Refills: 3 | Status: SHIPPED | OUTPATIENT
Start: 2025-06-27

## 2025-06-27 RX ORDER — AMLODIPINE BESYLATE 10 MG/1
10 TABLET ORAL DAILY
Qty: 90 TABLET | Refills: 3 | Status: SHIPPED | OUTPATIENT
Start: 2025-06-27

## 2025-06-27 NOTE — PROGRESS NOTES
Sera Whelan  59 y.o. Black or  female  [id number]    Chief Complaint:  Chief Complaint   Patient presents with    Annual Exam       History of Present Illness:  History of Present Illness    CHIEF COMPLAINT:  Ms. Whelan presents today for an annual visit.     PSYCHIATRIC HISTORY:  She was recently hospitalized from May 2nd to May 12th for psychiatric care, during which her medication was changed. She has a documented history of schizophrenia and bipolar disorder. Her family reports concerns about her mental health status prior to hospitalization. She has a follow-up psychiatric appointment scheduled for August 8th with Dr. Monreal.    CURRENT MEDICATIONS:  She takes Haldol at night for schizophrenia management, having discontinued Risperdal. For blood pressure management, she takes Amlodipine, Hydrochlorothiazide, and Losartan. She reports compliance with all prescribed medications, though blood pressure remains slightly elevated despite current regimen.    IMMUNIZATION STATUS:  She has received two COVID vaccine doses, with Moderna as the second dose. She expresses reluctance toward receiving additional COVID vaccinations.    SKIN:  She has a callous area on her left foot without associated pain or discomfort.         Review of Systems   Unable to perform ROS: Mental acuity   Constitutional:  Negative for fever.       Laboratory  Lab Results   Component Value Date    WBC 4.63 01/03/2025    HGB 11.1 (L) 01/03/2025    HCT 34.5 (L) 01/03/2025     01/03/2025    CHOL 124 01/03/2025    TRIG 58 01/03/2025    HDL 41 01/03/2025    ALT 15 01/03/2025    AST 19 01/03/2025     01/03/2025    K 3.9 01/03/2025     01/03/2025    CREATININE 0.8 01/03/2025    BUN 14 01/03/2025    CO2 28 01/03/2025    TSH 0.728 07/21/2023    INR 1.0 02/01/2015    HGBA1C 6.2 (H) 12/06/2024     Lab Results   Component Value Date    LDLCALC 71.4 01/03/2025       The following were reviewed: Active problem list,  medication list, allergies, family history, social history, and Health Maintenance.     History:  Past Medical History:   Diagnosis Date    Bipolar 1 disorder     Diabetes mellitus     Encounter for blood transfusion     Hyperlipidemia     Hypertension     Seizures     Stroke      Past Surgical History:   Procedure Laterality Date    COLONOSCOPY N/A 03/30/2021    Procedure: COLONOSCOPY;  Surgeon: Tani Rust MD;  Location: [facility];  Service: Endoscopy;  Laterality: N/A;     Family History   Problem Relation Name Age of Onset    Diabetes Mother      Stroke Mother       Social History[1]  Problem List[2]  Review of patient's allergies indicates:  No Known Allergies    Health Maintenance  Health Maintenance Topics with due status: Not Due       Topic Last Completion Date    TETANUS VACCINE 09/25/2017    Cervical Cancer Screening 05/05/2021    Colorectal Cancer Screening 07/15/2021    Diabetes Urine Screening 01/03/2025    Lipid Panel 01/03/2025    Mammogram 05/23/2025    High Dose Statin 06/27/2025     Health Maintenance Due   Topic Date Due    Diabetic Eye Exam  Never done    RSV Vaccine (Age 60+ and Pregnant patients) (1 - Risk 60-74 years 1-dose series) Never done    Foot Exam  05/03/2025    Hemoglobin A1c  06/06/2025       Medications:  Medications Ordered Prior to Encounter[3]    Medications have been reviewed and reconciled with patient at visit today.    Exam:  Vitals:    06/27/25 0955   BP: (!) 132/92   Pulse:    Temp:      Weight: 58.8 kg (129 lb 10.1 oz)   Body mass index is 21.57 kg/m².      Physical Exam    Vitals: Reviewed. Elevated blood pressure.  Constitutional: No acute distress. Well-developed. Not ill-appearing.  Eyes: No scleral icterus.  Cardiovascular: Normal rate and regular rhythm. Normal heart sounds.  Pulmonary: Pulmonary effort is normal. No respiratory distress. Normal breath sounds.  Abdomen: Soft. Nontender. Nondistended. Normoactive bowel sounds.  Extremities: No edema.  Skin:  Warm. Dry. Callus noted on foot.  Neurological: Alert  Decreased sensation in some areas during monofilament testing. Normal sensation in some areas during monofilament testing.    Physical Exam  Cardiovascular:      Pulses:           Dorsalis pedis pulses are 2+ on the right side and 2+ on the left side.   Feet:      Right foot:      Protective Sensation: 5 sites tested.  4 sites sensed.      Skin integrity: Dry skin present.      Left foot:      Protective Sensation: 5 sites tested.  4 sites sensed.      Skin integrity: Callus and dry skin present.           Assessment:  The primary encounter diagnosis was Annual physical exam. 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  - Ms. Whelan to bring all medications to next appointment for review.    SCHIZOPHRENIA, UNSPECIFIED TYPE:  - Considered need for psychiatric follow-up given history of schizophrenia and recent hospitalization.    HYPERTENSION GOAL BP (BLOOD PRESSURE) < 130/80:  - Assessed BP, noting it was elevated despite current medication regimen.  - Increased losartan to 50 mg daily from 25 mg daily.  - Continued amlodipine.  - Continued HCTZ.  - Follow up in 1 month if BP remains elevated.    MIXED HYPERLIPIDEMIA:  - Ordered fasting lipid panel for next Friday.     CONTROLLED TYPE 2 DIABETES MELLITUS WITH PROTEINURIC DIABETIC NEPHROPATHY:  -  Referred to podiatrist for foot evaluation given need for diabetic foot care.  - Ordered A1C for next Friday.  - Evaluated need for eye exam.                     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 60 tablet 2    blood sugar diagnostic Strp To check BG two times daily, to use with insurance preferred meter 200 each 1    haloperidoL (HALDOL) 10 MG tablet Take 10 mg by mouth every evening.      lancets Misc To check BG two times daily, to use with insurance preferred meter 200 each 1    levETIRAcetam (KEPPRA) 500 MG Tab Take 2 tablets by mouth twice daily 360 tablet 0    risperiDONE (RISPERDAL) 2 MG tablet TAKE 1 TABLET BY MOUTH AT BEDTIME 30 tablet 3    [DISCONTINUED] amLODIPine (NORVASC) 10 MG tablet Take 1 tablet (10 mg total) by mouth once daily. 90 tablet 1    [DISCONTINUED] atorvastatin (LIPITOR) 40 MG tablet Take 1 tablet by mouth once daily 90 tablet 1    [DISCONTINUED] hydroCHLOROthiazide 12.5 MG Tab Take 12.5 mg by mouth.      [DISCONTINUED] losartan (COZAAR) 25 MG tablet Take 1 tablet (25 mg total) by mouth once daily. 90 tablet 1    [DISCONTINUED] losartan (COZAAR) 50 MG tablet Take 50 mg by mouth.      [DISCONTINUED] metFORMIN (GLUCOPHAGE-XR) 500 MG ER  24hr tablet Take 1 tablet (500 mg total) by mouth daily with breakfast. 90 tablet 1    blood-glucose meter kit To check BG two times daily, to use with insurance preferred meter 1 each 0     No current facility-administered medications on file prior to visit.

## 2025-07-01 ENCOUNTER — PATIENT OUTREACH (OUTPATIENT)
Dept: ADMINISTRATIVE | Facility: HOSPITAL | Age: 60
End: 2025-07-01
Payer: MEDICAID

## 2025-07-10 ENCOUNTER — PATIENT MESSAGE (OUTPATIENT)
Dept: INTERNAL MEDICINE | Facility: CLINIC | Age: 60
End: 2025-07-10
Payer: MEDICAID

## 2025-07-18 ENCOUNTER — LAB VISIT (OUTPATIENT)
Dept: LAB | Facility: HOSPITAL | Age: 60
End: 2025-07-18
Attending: INTERNAL MEDICINE
Payer: MEDICAID

## 2025-07-18 DIAGNOSIS — Z00.00 ANNUAL PHYSICAL EXAM: ICD-10-CM

## 2025-07-18 LAB
ABSOLUTE EOSINOPHIL (OHS): 0.18 K/UL
ABSOLUTE MONOCYTE (OHS): 0.47 K/UL (ref 0.3–1)
ABSOLUTE NEUTROPHIL COUNT (OHS): 2.69 K/UL (ref 1.8–7.7)
ALBUMIN SERPL BCP-MCNC: 3.6 G/DL (ref 3.5–5.2)
ALP SERPL-CCNC: 115 UNIT/L (ref 40–150)
ALT SERPL W/O P-5'-P-CCNC: 11 UNIT/L (ref 10–44)
ANION GAP (OHS): 10 MMOL/L (ref 8–16)
AST SERPL-CCNC: 15 UNIT/L (ref 11–45)
BASOPHILS # BLD AUTO: 0.03 K/UL
BASOPHILS NFR BLD AUTO: 0.6 %
BILIRUB SERPL-MCNC: 0.3 MG/DL (ref 0.1–1)
BUN SERPL-MCNC: 17 MG/DL (ref 8–23)
CALCIUM SERPL-MCNC: 9.2 MG/DL (ref 8.7–10.5)
CHLORIDE SERPL-SCNC: 108 MMOL/L (ref 95–110)
CHOLEST SERPL-MCNC: 153 MG/DL (ref 120–199)
CHOLEST/HDLC SERPL: 4.1 {RATIO} (ref 2–5)
CO2 SERPL-SCNC: 25 MMOL/L (ref 23–29)
CREAT SERPL-MCNC: 1 MG/DL (ref 0.5–1.4)
ERYTHROCYTE [DISTWIDTH] IN BLOOD BY AUTOMATED COUNT: 13 % (ref 11.5–14.5)
GFR SERPLBLD CREATININE-BSD FMLA CKD-EPI: >60 ML/MIN/1.73/M2
GLUCOSE SERPL-MCNC: 93 MG/DL (ref 70–110)
HCT VFR BLD AUTO: 35.7 % (ref 37–48.5)
HDLC SERPL-MCNC: 37 MG/DL (ref 40–75)
HDLC SERPL: 24.2 % (ref 20–50)
HGB BLD-MCNC: 11.7 GM/DL (ref 12–16)
IMM GRANULOCYTES # BLD AUTO: 0.01 K/UL (ref 0–0.04)
IMM GRANULOCYTES NFR BLD AUTO: 0.2 % (ref 0–0.5)
LDLC SERPL CALC-MCNC: 92 MG/DL (ref 63–159)
LYMPHOCYTES # BLD AUTO: 1.44 K/UL (ref 1–4.8)
MCH RBC QN AUTO: 31.5 PG (ref 27–31)
MCHC RBC AUTO-ENTMCNC: 32.8 G/DL (ref 32–36)
MCV RBC AUTO: 96 FL (ref 82–98)
NONHDLC SERPL-MCNC: 116 MG/DL
NUCLEATED RBC (/100WBC) (OHS): 0 /100 WBC
PLATELET # BLD AUTO: 275 K/UL (ref 150–450)
PMV BLD AUTO: 11 FL (ref 9.2–12.9)
POTASSIUM SERPL-SCNC: 4.2 MMOL/L (ref 3.5–5.1)
PROT SERPL-MCNC: 7.4 GM/DL (ref 6–8.4)
RBC # BLD AUTO: 3.72 M/UL (ref 4–5.4)
RELATIVE EOSINOPHIL (OHS): 3.7 %
RELATIVE LYMPHOCYTE (OHS): 29.9 % (ref 18–48)
RELATIVE MONOCYTE (OHS): 9.8 % (ref 4–15)
RELATIVE NEUTROPHIL (OHS): 55.8 % (ref 38–73)
SODIUM SERPL-SCNC: 143 MMOL/L (ref 136–145)
TRIGL SERPL-MCNC: 120 MG/DL (ref 30–150)
TSH SERPL-ACNC: 1.52 UIU/ML (ref 0.4–4)
WBC # BLD AUTO: 4.82 K/UL (ref 3.9–12.7)

## 2025-07-18 PROCEDURE — 82465 ASSAY BLD/SERUM CHOLESTEROL: CPT

## 2025-07-18 PROCEDURE — 84075 ASSAY ALKALINE PHOSPHATASE: CPT

## 2025-07-18 PROCEDURE — 85025 COMPLETE CBC W/AUTO DIFF WBC: CPT

## 2025-07-18 PROCEDURE — 84443 ASSAY THYROID STIM HORMONE: CPT

## 2025-07-18 PROCEDURE — 36415 COLL VENOUS BLD VENIPUNCTURE: CPT

## 2025-07-31 ENCOUNTER — PATIENT MESSAGE (OUTPATIENT)
Dept: INTERNAL MEDICINE | Facility: CLINIC | Age: 60
End: 2025-07-31
Payer: MEDICAID

## 2025-08-08 ENCOUNTER — OFFICE VISIT (OUTPATIENT)
Dept: PSYCHIATRY | Facility: CLINIC | Age: 60
End: 2025-08-08
Payer: MEDICAID

## 2025-08-08 VITALS
HEART RATE: 88 BPM | BODY MASS INDEX: 21.09 KG/M2 | WEIGHT: 126.75 LBS | DIASTOLIC BLOOD PRESSURE: 128 MMHG | SYSTOLIC BLOOD PRESSURE: 181 MMHG

## 2025-08-08 DIAGNOSIS — F20.9 SCHIZOPHRENIA, UNSPECIFIED TYPE: Primary | ICD-10-CM

## 2025-08-08 DIAGNOSIS — Z91.199 CURRENT NON-ADHERENCE TO MEDICAL TREATMENT: ICD-10-CM

## 2025-08-08 PROCEDURE — 3077F SYST BP >= 140 MM HG: CPT | Mod: CPTII,AF,HB, | Performed by: PSYCHIATRY & NEUROLOGY

## 2025-08-08 PROCEDURE — 3080F DIAST BP >= 90 MM HG: CPT | Mod: CPTII,AF,HB, | Performed by: PSYCHIATRY & NEUROLOGY

## 2025-08-08 PROCEDURE — 99211 OFF/OP EST MAY X REQ PHY/QHP: CPT | Mod: PBBFAC | Performed by: PSYCHIATRY & NEUROLOGY

## 2025-08-08 PROCEDURE — 99214 OFFICE O/P EST MOD 30 MIN: CPT | Mod: S$PBB,AF,HB, | Performed by: PSYCHIATRY & NEUROLOGY

## 2025-08-08 PROCEDURE — 3008F BODY MASS INDEX DOCD: CPT | Mod: CPTII,AF,HB, | Performed by: PSYCHIATRY & NEUROLOGY

## 2025-08-08 PROCEDURE — 3062F POS MACROALBUMINURIA REV: CPT | Mod: CPTII,AF,HB, | Performed by: PSYCHIATRY & NEUROLOGY

## 2025-08-08 PROCEDURE — 4010F ACE/ARB THERAPY RXD/TAKEN: CPT | Mod: CPTII,AF,HB, | Performed by: PSYCHIATRY & NEUROLOGY

## 2025-08-08 PROCEDURE — 99999 PR PBB SHADOW E&M-EST. PATIENT-LVL I: CPT | Mod: PBBFAC,AF,HB, | Performed by: PSYCHIATRY & NEUROLOGY

## 2025-08-08 PROCEDURE — 3066F NEPHROPATHY DOC TX: CPT | Mod: CPTII,AF,HB, | Performed by: PSYCHIATRY & NEUROLOGY

## 2025-08-08 RX ORDER — RISPERIDONE 2 MG/1
TABLET ORAL
Qty: 30 TABLET | Refills: 3 | Status: SHIPPED | OUTPATIENT
Start: 2025-08-08

## 2025-08-08 RX ORDER — BENZTROPINE MESYLATE 1 MG/1
TABLET ORAL
Qty: 60 TABLET | Refills: 3 | Status: SHIPPED | OUTPATIENT
Start: 2025-08-08

## 2025-08-14 ENCOUNTER — PATIENT MESSAGE (OUTPATIENT)
Dept: INTERNAL MEDICINE | Facility: CLINIC | Age: 60
End: 2025-08-14
Payer: MEDICAID

## 2025-08-19 ENCOUNTER — PATIENT MESSAGE (OUTPATIENT)
Dept: ADMINISTRATIVE | Facility: HOSPITAL | Age: 60
End: 2025-08-19
Payer: MEDICAID